# Patient Record
Sex: FEMALE | Race: WHITE | NOT HISPANIC OR LATINO | Employment: OTHER | URBAN - METROPOLITAN AREA
[De-identification: names, ages, dates, MRNs, and addresses within clinical notes are randomized per-mention and may not be internally consistent; named-entity substitution may affect disease eponyms.]

---

## 2021-01-18 ENCOUNTER — TRANSFERRED RECORDS (OUTPATIENT)
Dept: HEALTH INFORMATION MANAGEMENT | Facility: CLINIC | Age: 61
End: 2021-01-18

## 2021-01-22 ENCOUNTER — TRANSFERRED RECORDS (OUTPATIENT)
Dept: HEALTH INFORMATION MANAGEMENT | Facility: CLINIC | Age: 61
End: 2021-01-22

## 2021-02-04 ENCOUNTER — TRANSFERRED RECORDS (OUTPATIENT)
Dept: HEALTH INFORMATION MANAGEMENT | Facility: CLINIC | Age: 61
End: 2021-02-04

## 2021-02-18 ENCOUNTER — TRANSFERRED RECORDS (OUTPATIENT)
Dept: HEALTH INFORMATION MANAGEMENT | Facility: CLINIC | Age: 61
End: 2021-02-18

## 2021-02-23 ENCOUNTER — TRANSFERRED RECORDS (OUTPATIENT)
Dept: HEALTH INFORMATION MANAGEMENT | Facility: CLINIC | Age: 61
End: 2021-02-23

## 2021-03-17 ENCOUNTER — TRANSFERRED RECORDS (OUTPATIENT)
Dept: HEALTH INFORMATION MANAGEMENT | Facility: CLINIC | Age: 61
End: 2021-03-17

## 2021-03-18 ENCOUNTER — TRANSFERRED RECORDS (OUTPATIENT)
Dept: HEALTH INFORMATION MANAGEMENT | Facility: CLINIC | Age: 61
End: 2021-03-18

## 2021-03-22 ENCOUNTER — TRANSFERRED RECORDS (OUTPATIENT)
Dept: HEALTH INFORMATION MANAGEMENT | Facility: CLINIC | Age: 61
End: 2021-03-22

## 2021-03-23 ENCOUNTER — MEDICAL CORRESPONDENCE (OUTPATIENT)
Dept: HEALTH INFORMATION MANAGEMENT | Facility: CLINIC | Age: 61
End: 2021-03-23

## 2021-03-23 ENCOUNTER — TRANSCRIBE ORDERS (OUTPATIENT)
Dept: OTHER | Age: 61
End: 2021-03-23

## 2021-03-23 DIAGNOSIS — H49.00 THIRD NERVE PALSY: Primary | ICD-10-CM

## 2021-04-01 ENCOUNTER — OFFICE VISIT (OUTPATIENT)
Dept: OPHTHALMOLOGY | Facility: CLINIC | Age: 61
End: 2021-04-01
Attending: OPHTHALMOLOGY
Payer: COMMERCIAL

## 2021-04-01 DIAGNOSIS — H49.00 THIRD NERVE PALSY: ICD-10-CM

## 2021-04-01 DIAGNOSIS — H49.01 RIGHT OCULOMOTOR NERVE PALSY: ICD-10-CM

## 2021-04-01 DIAGNOSIS — H53.10 SUBJECTIVE VISUAL DISTURBANCE: Primary | ICD-10-CM

## 2021-04-01 PROBLEM — I25.10 CORONARY ARTERY CALCIFICATION SEEN ON CT SCAN: Status: ACTIVE | Noted: 2019-11-08

## 2021-04-01 PROBLEM — Z87.891 HISTORY OF SMOKING 30 OR MORE PACK YEARS: Status: ACTIVE | Noted: 2020-12-16

## 2021-04-01 PROBLEM — D32.9 MENINGIOMA (H): Status: ACTIVE | Noted: 2021-03-17

## 2021-04-01 PROBLEM — E55.9 VITAMIN D DEFICIENCY: Status: ACTIVE | Noted: 2020-12-17

## 2021-04-01 PROBLEM — J43.9 PULMONARY EMPHYSEMA (H): Status: ACTIVE | Noted: 2019-11-08

## 2021-04-01 PROBLEM — M85.80 OSTEOPENIA, UNSPECIFIED LOCATION: Status: ACTIVE | Noted: 2019-11-05

## 2021-04-01 PROBLEM — Z86.000 HISTORY OF DUCTAL CARCINOMA IN SITU (DCIS) OF BREAST: Status: ACTIVE | Noted: 2021-04-01

## 2021-04-01 PROBLEM — R73.9 STRESS HYPERGLYCEMIA: Status: ACTIVE | Noted: 2021-04-01

## 2021-04-01 PROBLEM — Z98.890 S/P CRANIOTOMY: Status: ACTIVE | Noted: 2021-04-01

## 2021-04-01 PROBLEM — R91.1 LUNG NODULE: Status: ACTIVE | Noted: 2021-01-04

## 2021-04-01 PROCEDURE — 92060 SENSORIMOTOR EXAMINATION: CPT | Performed by: OPHTHALMOLOGY

## 2021-04-01 PROCEDURE — G0463 HOSPITAL OUTPT CLINIC VISIT: HCPCS | Mod: 25

## 2021-04-01 PROCEDURE — 99204 OFFICE O/P NEW MOD 45 MIN: CPT | Performed by: OPHTHALMOLOGY

## 2021-04-01 RX ORDER — CALCIUM CARBONATE 500(1250)
250 TABLET,CHEWABLE ORAL DAILY
COMMUNITY

## 2021-04-01 RX ORDER — LISINOPRIL 10 MG/1
10 TABLET ORAL
Status: ON HOLD | COMMUNITY
Start: 2021-03-23 | End: 2022-02-21

## 2021-04-01 RX ORDER — ONDANSETRON 4 MG/1
4 TABLET, ORALLY DISINTEGRATING ORAL
Status: ON HOLD | COMMUNITY
Start: 2021-03-22 | End: 2022-02-21

## 2021-04-01 RX ORDER — AMOXICILLIN 250 MG
1-2 CAPSULE ORAL
Status: ON HOLD | COMMUNITY
Start: 2021-03-22 | End: 2022-02-21

## 2021-04-01 RX ORDER — DEXAMETHASONE 2 MG/1
TABLET ORAL
Status: ON HOLD | COMMUNITY
Start: 2021-03-22 | End: 2022-02-21

## 2021-04-01 RX ORDER — CEPHALEXIN 500 MG/1
CAPSULE ORAL
Status: ON HOLD | COMMUNITY
Start: 2021-03-02 | End: 2022-02-21

## 2021-04-01 RX ORDER — ATORVASTATIN CALCIUM 10 MG/1
TABLET, FILM COATED ORAL
COMMUNITY
Start: 2020-12-17

## 2021-04-01 ASSESSMENT — CONF VISUAL FIELD
OS_NORMAL: 1
OD_NORMAL: 1

## 2021-04-01 ASSESSMENT — TONOMETRY
OD_IOP_MMHG: 18
OS_IOP_MMHG: 22
IOP_METHOD: ICARE

## 2021-04-01 ASSESSMENT — CUP TO DISC RATIO
OD_RATIO: 0.3
OS_RATIO: 0.35

## 2021-04-01 ASSESSMENT — VISUAL ACUITY
OD_PH_SC: 20/50
OS_PH_SC: 20/25
OD_SC: 20/60
OS_SC: 20/40
METHOD: SNELLEN - LINEAR

## 2021-04-01 ASSESSMENT — EXTERNAL EXAM - LEFT EYE: OS_EXAM: NORMAL

## 2021-04-01 ASSESSMENT — SLIT LAMP EXAM - LIDS
COMMENTS: NORMAL
COMMENTS: NORMAL

## 2021-04-01 NOTE — PROGRESS NOTES
Assessment & Plan     Glory Ozuna is a 61 year old female with the following diagnoses:   1. Third nerve palsy    2. Subjective visual disturbance    3. Right oculomotor nerve palsy         Patient was sent for consultation by Dr. Kiko Zapata for right third nerve palsy onset s/p right pterional craniotomy for resection of suprasellar meningioma on 3/17/21.    HPI:  Since her meningioma resection on 3/17/21 she has been unable to open her right eye. She does note that her eye is displaced out when she opens here eye. She does note that when she opens the eye she is light sensitive. Visual acuity does not seem different in that eye and she is not experiencing any blurring or visual field defects. Since her surgery she often believes she sees her children and grandchildren at her home and they are not there. She also believed she had a roommate in the hospital during her stay when she did not. This occurs when she is resting with both eyes closed. She does not note any pain, double vision, bright flashes, floaters, or abnormal features of the right eye other than it being closed.         Review of outside testing:  3/17/21- MRI Brain      2/4/21- MRI Brain      My interpretation performed today of outside testing:  Large mass centered on right cavernous sinus       Review of outside clinical notes:  Neurosurgery notes     Past medical history:  Meningioma  Breast cancer 2008, s/p bilateral mastectomy and reconstruction 2011  Basal cell carcinoma    Family history / social history:  None    Previous smoker, quit 11/26/19, 1 pack/day x 30 years    Exam:  Vision is 20/60 in the right eye and 20/40 in the left eye. Intraocular pressure is 18/22. Color plates are 4/11 right, 11/11 left. Right pupil with an afferent pupillary defect. Confrontational visual fields are full. Sensorimotor exam is consistent with a combined complete cranial nerve III palsy and cranial nerve IV palsy on the right.  Slit lamp exam is unremarkable aside from 2= nuclear sclerotic cataract. Dilated fundus exam with positive spontaneous venous pulsations RIGHT eye with single druse in the macula of the RIGHT eye and normal left eye.     Tests ordered and interpreted today:  Sensorimotor exam    It is my impression that the patient has a complete CN III palsy and CN IV palsy on the right side that was discovered following excision of a suprasellar mass. Although visual acuity and color plates are reduced, there was no evidence of an afferent pupillary defect.  We did not do visual fields or optical coherence tomography today because of the significant exotropia.  Once the eyelid starts to open can consider patching versus black contact lenses versus prism.  Follow up in 3-4 months or sooner as needed for worsening symptoms.              Attending Physician Attestation:  Complete documentation of historical and exam elements from today's encounter can be found in the full encounter summary report (not reduplicated in this progress note).  I personally obtained the chief complaint(s) and history of present illness.  I confirmed and edited as necessary the review of systems, past medical/surgical history, family history, social history, and examination findings as documented by others; and I examined the patient myself.  I personally reviewed the relevant tests, images, and reports as documented above.  I formulated and edited as necessary the assessment and plan and discussed the findings and management plan with the patient and family. I was present with the medical student who participated in the service and in the documentation of this note. - MD Adi Plasencia, MS4

## 2021-04-01 NOTE — NURSING NOTE
Chief Complaints and History of Present Illnesses   Patient presents with     Diplopia Evaluation     Chief Complaint(s) and History of Present Illness(es)     Diplopia Evaluation               Comments     Glory Lurdes Ozuna is a 61 year old female who presents today for    Third nerve palsy. Onset after S/p Right pterional craniotomy for resection of meningioma 3/17/2021.     Wilda HALL 8:55 AM April 1, 2021

## 2021-04-01 NOTE — LETTER
2021         RE:  :  MRN: Glory Ozuna  1960  [id number]     Dear Dr. Perez,    Thank you for asking me to see your very pleasant patient, Glory Ozuna, in neuro-ophthalmic consultation.  I would like to thank you for sending your records and I have summarized them in the history of present illness.  My assessment and plan are below.  For further details, please see my attached clinic note.      Assessment & Plan     Glory Ozuna is a 61 year old female with the following diagnoses:   1. Third nerve palsy    2. Subjective visual disturbance    3. Right oculomotor nerve palsy         Patient was sent for consultation by Dr. Kiko Zapata for right third nerve palsy onset s/p right pterional craniotomy for resection of suprasellar meningioma on 3/17/21.    HPI:  Since her meningioma resection on 3/17/21 she has been unable to open her right eye. She does note that her eye is displaced out when she opens here eye. She does note that when she opens the eye she is light sensitive. Visual acuity does not seem different in that eye and she is not experiencing any blurring or visual field defects. Since her surgery she often believes she sees her children and grandchildren at her home and they are not there. She also believed she had a roommate in the hospital during her stay when she did not. This occurs when she is resting with both eyes closed. She does not note any pain, double vision, bright flashes, floaters, or abnormal features of the right eye other than it being closed.         Review of outside testing:  3/17/21- MRI Brain      21- MRI Brain      My interpretation performed today of outside testing:  Large mass centered on right cavernous sinus       Review of outside clinical notes:  Neurosurgery notes     Past medical history:  Meningioma  Breast cancer , s/p bilateral mastectomy and reconstruction   Basal cell carcinoma    Family history /  social history:  None    Previous smoker, quit 11/26/19, 1 pack/day x 30 years    Exam:  Vision is 20/60 in the right eye and 20/40 in the left eye. Intraocular pressure is 18/22. Color plates are 4/11 right, 11/11 left. Right pupil with an afferent pupillary defect. Confrontational visual fields are full. Sensorimotor exam is consistent with a combined complete cranial nerve III palsy and cranial nerve IV palsy on the right. Slit lamp exam is unremarkable aside from 2= nuclear sclerotic cataract. Dilated fundus exam with positive spontaneous venous pulsations RIGHT eye with single druse in the macula of the RIGHT eye and normal left eye.     Tests ordered and interpreted today:  Sensorimotor exam    It is my impression that the patient has a complete CN III palsy and CN IV palsy on the right side that was discovered following excision of a suprasellar mass. Although visual acuity and color plates are reduced, there was no evidence of an afferent pupillary defect.  We did not do visual fields or optical coherence tomography today because of the significant exotropia.  Once the eyelid starts to open can consider patching versus black contact lenses versus prism.  Follow up in 3-4 months or sooner as needed for worsening symptoms.             Again, thank you for allowing me to participate in the care of your patient.      Sincerely,    Mason Delgado MD  Professor  Ophthalmology Residency   Director of Neuro-Ophthalmology  [employer] Endowed Chair  Departments of Ophthalmology, Neurology, and Neurosurgery  [facility] [address] [place]  [address]  T - [phone]  F - [phone]  [email or web address]      CC: Kiko Perez MD  [facility]  [address] [address]  [facility] [address]  Via In Basket     Elvira Garg  [facility]  [address]  [place] [address]  Via Fax: [phone]    DX = 3rd  nerve palsy, 4th nerve palsy, cavernous sinus meningioma

## 2021-04-01 NOTE — Clinical Note
4/1/2021       RE: Glory Ozuna  [address]  [place] [address]     Dear Colleague,    Thank you for referring your patient, Glory Ozuna, to the [facility] at [facility]. Please see a copy of my visit note below.         Assessment & Plan     Glory Ozuna is a 61 year old female with the following diagnoses:   1. Third nerve palsy    2. Subjective visual disturbance    3. Right oculomotor nerve palsy         Patient was sent for consultation by Dr. Kiko Zapata for right third nerve palsy onset s/p right pterional craniotomy for resection of suprasellar meningioma on 3/17/21.    HPI:  Since her meningioma resection on 3/17/21 she has been unable to open her right eye. She does note that her eye is displaced out when she opens here eye. She does note that when she opens the eye she is light sensitive. Visual acuity does not seem different in that eye and she is not experiencing any blurring or visual field defects. Since her surgery she often believes she sees her children and grandchildren at her home and they are not there. She also believed she had a roommate in the hospital during her stay when she did not. This occurs when she is resting with both eyes closed. She does not note any pain, double vision, bright flashes, floaters, or abnormal features of the right eye other than it being closed.         Review of outside testing:  3/17/21- MRI Brain      2/4/21- MRI Brain      My interpretation performed today of outside testing:  Large mass centered on right cavernous sinus       Review of outside clinical notes:  Neurosurgery notes     Past medical history:  Meningioma  Breast cancer 2008, s/p bilateral mastectomy and reconstruction 2011  Basal cell carcinoma    Family history / social history:  None    Previous smoker, quit 11/26/19, 1 pack/day x 30 years    Exam:  Vision is 20/60 in the right eye and  20/40 in the left eye. Intraocular pressure is 18/22. Color plates are 4/11 right, 11/11 left. Right pupil with an afferent pupillary defect. Confrontational visual fields are full. Sensorimotor exam is consistent with a combined complete cranial nerve III palsy and cranial nerve IV palsy on the right. Slit lamp exam is unremarkable aside from 2= nuclear sclerotic cataract. Dilated fundus exam with positive spontaneous venous pulsations RIGHT eye with single druse in the macula of the RIGHT eye and normal left eye.     Tests ordered and interpreted today:  Sensorimotor exam    It is my impression that the patient has a complete CN III palsy and CN IV palsy on the right side that was discovered following excision of a suprasellar mass. Although visual acuity and color plates are reduced, there was no evidence of an afferent pupillary defect.  We did not do visual fields or optical coherence tomography today because of the significant exotropia.  Once the eyelid starts to open can consider patching versus black contact lenses versus prism.  Follow up in 3-4 months or sooner as needed for worsening symptoms.              Attending Physician Attestation:  Complete documentation of historical and exam elements from today's encounter can be found in the full encounter summary report (not reduplicated in this progress note).  I personally obtained the chief complaint(s) and history of present illness.  I confirmed and edited as necessary the review of systems, past medical/surgical history, family history, social history, and examination findings as documented by others; and I examined the patient myself.  I personally reviewed the relevant tests, images, and reports as documented above.  I formulated and edited as necessary the assessment and plan and discussed the findings and management plan with the patient and family. I was present with the medical student who participated in the service and in the documentation of  this note. - MD Adi Plasencia, MS4          Again, thank you for allowing me to participate in the care of your patient.      Sincerely,    Mason Delgado MD

## 2021-07-01 ENCOUNTER — OFFICE VISIT (OUTPATIENT)
Dept: OPHTHALMOLOGY | Facility: CLINIC | Age: 61
End: 2021-07-01
Attending: OPHTHALMOLOGY
Payer: COMMERCIAL

## 2021-07-01 DIAGNOSIS — H53.10 SUBJECTIVE VISUAL DISTURBANCE: Primary | ICD-10-CM

## 2021-07-01 DIAGNOSIS — H49.00 3RD CRANIAL NERVE PALSY, UNSPECIFIED LATERALITY: ICD-10-CM

## 2021-07-01 DIAGNOSIS — H53.40 VISUAL FIELD DEFECT: Primary | ICD-10-CM

## 2021-07-01 PROCEDURE — 99214 OFFICE O/P EST MOD 30 MIN: CPT | Mod: GC | Performed by: OPHTHALMOLOGY

## 2021-07-01 PROCEDURE — 92060 SENSORIMOTOR EXAMINATION: CPT | Performed by: OPHTHALMOLOGY

## 2021-07-01 PROCEDURE — 92083 EXTENDED VISUAL FIELD XM: CPT | Performed by: OPHTHALMOLOGY

## 2021-07-01 PROCEDURE — G0463 HOSPITAL OUTPT CLINIC VISIT: HCPCS | Mod: 25

## 2021-07-01 ASSESSMENT — EXTERNAL EXAM - LEFT EYE: OS_EXAM: NORMAL

## 2021-07-01 ASSESSMENT — EXTERNAL EXAM - RIGHT EYE: OD_EXAM: NORMAL

## 2021-07-01 ASSESSMENT — TONOMETRY
IOP_METHOD: ICARE
OS_IOP_MMHG: 12
OD_IOP_MMHG: 11

## 2021-07-01 ASSESSMENT — VISUAL ACUITY
OD_SC+: -2
OD_SC: 20/50
OS_SC: 20/30
METHOD: SNELLEN - LINEAR

## 2021-07-01 ASSESSMENT — CUP TO DISC RATIO
OD_RATIO: 0.3
OS_RATIO: 0.35

## 2021-07-01 NOTE — PROGRESS NOTES
Assessment & Plan     Glory Ozuna is a 61 year old female with the following diagnoses:   1. Visual field defect    2. 3rd cranial nerve palsy, unspecified laterality       Initial HPI (4/1/21):  Since her meningioma resection on 3/17/21 she has been unable to open her right eye. She does note that her eye is displaced out when she opens here eye. She does note that when she opens the eye she is light sensitive. Visual acuity does not seem different in that eye and she is not experiencing any blurring or visual field defects. Since her surgery she often believes she sees her children and grandchildren at her home and they are not there. She also believed she had a roommate in the hospital during her stay when she did not. This occurs when she is resting with both eyes closed. She does not note any pain, double vision, bright flashes, floaters, or abnormal features of the right eye other than it being closed.     Interval HPI (7/1/21): Patient was last seen on 4/1/21 for complete CN III palsy and CN IV palsy on the right side that was discovered following excision of a suprasellar mass.  We discussed that we could consider patching versus black contact lens versus prism once the eyelid started opening. Since her last appointment, the patient notes that her right upper eyelid has been progressively more open. The patient has persistent double vision, which she describes as two objects on top of one another when looking straight ahead and two objects at a diagonal from one another occasionally, unsure which gaze. She is unsure if there is any improvement in the double vision. She was seen by neurosurgery on 5/18/2021, recommended to follow up in 6 months.      Review of outside testing:  3/17/21- MRI Brain       2/4/21- MRI Brain       My interpretation performed today of outside testing:  Large mass centered on right cavernous sinus       Review of outside clinical notes:  Neurosurgery notes      Past  medical history:  Meningioma  Breast cancer 2008, s/p bilateral mastectomy and reconstruction 2011  Basal cell carcinoma     Family history / social history:  None     Previous smoker, quit 11/26/19, 1 pack/day x 30 years    HVF 24-2 (7/1/21):  Right eye: peripheral nasal constriction; high false neg errors  Left eye: peripheral superior constriction; high false neg errors     Exam:  Vision is 20/50 in the right eye and 20/30 in the left eye. Intraocular pressure is 11/12. Color plates are 9/11 right, 11/11 left. Right pupil without afferent pupillary defect. Confrontational visual fields are full. Sensorimotor exam is consistent with a combined complete cranial nerve III palsy and cranial nerve IV palsy on the right. Slit lamp exam is unremarkable aside from 2= nuclear sclerotic cataract. Dilated fundus exam at last appointment with positive spontaneous venous pulsations R IGHT eye with single druse in the macula of the RIGHT eye and normal left eye.      Tests ordered and interpreted today:  Sensorimotor exam  visual field      It is my impression that the patient has a complete CN III palsy and CN IV palsy on the right side that was discovered following excision of a suprasellar mass. Visual acuity and color plates are improving, there was no evidence of an afferent pupillary defect.  G top completed today.  As the patient's eyelid continues to lift independently, she has become more symptomatic with the double vision. Discussed with the patient that given different prism measurements in varying directions of gaze, prism correction unlikely to provide significant improvement to double vision at this time. Discussed patching vs fogging of glasses vs black contact lenses vs observation with the patient, who would like to continue to observe symptoms without monocular occlusion; she will consider contacting optometry to discuss contact lenses. Follow up in 4 months or sooner as needed for worsening symptoms.   Her  visual field shows bilateral visual field defect. It is unclear if this is related to an optic neuropathy or not.  Will check optical coherence tomography and visual field next visit.            Attending Physician Attestation:  Complete documentation of historical and exam elements from today's encounter can be found in the full encounter summary report (not reduplicated in this progress note).  I personally obtained the chief complaint(s) and history of present illness.  I confirmed and edited as necessary the review of systems, past medical/surgical history, family history, social history, and examination findings as documented by others; and I examined the patient myself.  I personally reviewed the relevant tests, images, and reports as documented above.  I formulated and edited as necessary the assessment and plan and discussed the findings and management plan with the patient and family. I personally reviewed the ophthalmic test(s) associated with this encounter, agree with the interpretation(s) as documented by the resident/fellow, and have edited the corresponding report(s) as necessary.  - Mason Soria MD  PGY-3, Department of Ophthalmology

## 2021-11-02 ENCOUNTER — OFFICE VISIT (OUTPATIENT)
Dept: OPHTHALMOLOGY | Facility: CLINIC | Age: 61
End: 2021-11-02
Attending: OPHTHALMOLOGY
Payer: COMMERCIAL

## 2021-11-02 DIAGNOSIS — H47.20 OPTIC ATROPHY: ICD-10-CM

## 2021-11-02 DIAGNOSIS — H53.10 SUBJECTIVE VISUAL DISTURBANCE: Primary | ICD-10-CM

## 2021-11-02 DIAGNOSIS — H49.00 3RD CRANIAL NERVE PALSY, UNSPECIFIED LATERALITY: Primary | ICD-10-CM

## 2021-11-02 DIAGNOSIS — H53.40 VISUAL FIELD DEFECT: ICD-10-CM

## 2021-11-02 DIAGNOSIS — H53.10 SUBJECTIVE VISUAL DISTURBANCE: ICD-10-CM

## 2021-11-02 PROCEDURE — 92060 SENSORIMOTOR EXAMINATION: CPT | Performed by: OPHTHALMOLOGY

## 2021-11-02 PROCEDURE — 92060 SENSORIMOTOR EXAMINATION: CPT | Mod: 26 | Performed by: OPHTHALMOLOGY

## 2021-11-02 PROCEDURE — 92014 COMPRE OPH EXAM EST PT 1/>: CPT | Performed by: OPHTHALMOLOGY

## 2021-11-02 PROCEDURE — 92083 EXTENDED VISUAL FIELD XM: CPT | Performed by: OPHTHALMOLOGY

## 2021-11-02 PROCEDURE — G0463 HOSPITAL OUTPT CLINIC VISIT: HCPCS

## 2021-11-02 PROCEDURE — 92133 CPTRZD OPH DX IMG PST SGM ON: CPT | Performed by: OPHTHALMOLOGY

## 2021-11-02 ASSESSMENT — EXTERNAL EXAM - LEFT EYE: OS_EXAM: NORMAL

## 2021-11-02 ASSESSMENT — CONF VISUAL FIELD
OD_NORMAL: 1
METHOD: COUNTING FINGERS
OS_NORMAL: 1

## 2021-11-02 ASSESSMENT — CUP TO DISC RATIO
OS_RATIO: 0.35
OD_RATIO: 0.3

## 2021-11-02 ASSESSMENT — VISUAL ACUITY
OD_SC: 20/25
OS_SC+: -1
OD_SC+: -3
OS_SC: 20/20
METHOD: SNELLEN - LINEAR

## 2021-11-02 ASSESSMENT — TONOMETRY
OD_IOP_MMHG: 10
OS_IOP_MMHG: 11
IOP_METHOD: ICARE

## 2021-11-02 ASSESSMENT — EXTERNAL EXAM - RIGHT EYE: OD_EXAM: NORMAL

## 2021-11-02 NOTE — PROGRESS NOTES
Assessment & Plan     Glory Ozuna is a 61 year old female with the following diagnoses:   1. 3rd cranial nerve palsy, unspecified laterality    2. Subjective visual disturbance    3. Visual field defect    4. Optic atrophy         Patient was last seen on 7/1/21 for follow up of complete third nerve third nerve palsy and fourth nerve palsy on the right side that was discovered following excision of a suprasellar mass.  Visual acuity and color plates were improving and there was no afferent pupillary defect.  Her visual field showed bilateral visual field defect and it was unclear if this was related to an optic neuropathy or not.  For the diplopia we felt prism would not be beneficial but could consider fogging, patching, or black contact lens.     Visual acuity 20/25 RIGHT eye and 20/20.  She has exotropia that is worse in left gaze and and left hypertropia with upgaze and right hypertropia with downgaze.  There is elevation, depression, and adduction deficits RIGHT eye.  Some aberrant regeneration.  Optical coherence tomography with retinal nerve fiber layer thinning both eyes. Visual field showed some superior constriction in both eyes which is stable from her previous visit.    It is my impression that patient has right third nerve palsy and fourth nerve palsy.  Her eye misalignment is improving today.  She does have repeatable visual field defect with retinal nerve fiber layer thinning on optical coherence tomography.    Follow up in 4 months to reassess her ocular motility and eye alignment or sooner as needed for worsening symptoms.          Attending Physician Attestation:  Complete documentation of historical and exam elements from today's encounter can be found in the full encounter summary report (not reduplicated in this progress note).  I personally obtained the chief complaint(s) and history of present illness.  I confirmed and edited as necessary the review of systems, past  medical/surgical history, family history, social history, and examination findings as documented by others; and I examined the patient myself.  I personally reviewed the relevant tests, images, and reports as documented above.  I formulated and edited as necessary the assessment and plan and discussed the findings and management plan with the patient and family. - Mason Delgado MD

## 2022-02-07 DIAGNOSIS — Z11.59 ENCOUNTER FOR SCREENING FOR OTHER VIRAL DISEASES: Primary | ICD-10-CM

## 2022-02-18 ENCOUNTER — HOSPITAL ENCOUNTER (OUTPATIENT)
Facility: CLINIC | Age: 62
Discharge: HOME | End: 2022-02-18
Attending: THORACIC SURGERY (CARDIOTHORACIC VASCULAR SURGERY) | Admitting: THORACIC SURGERY (CARDIOTHORACIC VASCULAR SURGERY)
Payer: COMMERCIAL

## 2022-02-18 ENCOUNTER — LAB (OUTPATIENT)
Dept: URGENT CARE | Facility: URGENT CARE | Age: 62
End: 2022-02-18
Attending: THORACIC SURGERY (CARDIOTHORACIC VASCULAR SURGERY)
Payer: COMMERCIAL

## 2022-02-18 PROCEDURE — U0005 INFEC AGEN DETEC AMPLI PROBE: HCPCS | Performed by: THORACIC SURGERY (CARDIOTHORACIC VASCULAR SURGERY)

## 2022-02-19 ENCOUNTER — ANESTHESIA EVENT (OUTPATIENT)
Dept: SURGERY | Facility: CLINIC | Age: 62
DRG: 165 | End: 2022-02-19
Payer: COMMERCIAL

## 2022-02-19 LAB — SARS-COV-2 RNA RESP QL NAA+PROBE: NEGATIVE

## 2022-02-21 ENCOUNTER — ANESTHESIA (OUTPATIENT)
Dept: SURGERY | Facility: CLINIC | Age: 62
DRG: 165 | End: 2022-02-21
Payer: COMMERCIAL

## 2022-02-21 ENCOUNTER — HOSPITAL ENCOUNTER (INPATIENT)
Facility: CLINIC | Age: 62
LOS: 1 days | Discharge: HOME | DRG: 165 | End: 2022-02-22
Attending: THORACIC SURGERY (CARDIOTHORACIC VASCULAR SURGERY) | Admitting: THORACIC SURGERY (CARDIOTHORACIC VASCULAR SURGERY)
Payer: COMMERCIAL

## 2022-02-21 ENCOUNTER — APPOINTMENT (OUTPATIENT)
Dept: GENERAL RADIOLOGY | Facility: CLINIC | Age: 62
DRG: 165 | End: 2022-02-21
Attending: THORACIC SURGERY (CARDIOTHORACIC VASCULAR SURGERY)
Payer: COMMERCIAL

## 2022-02-21 DIAGNOSIS — C34.12 PRIMARY ADENOCARCINOMA OF UPPER LOBE OF LEFT LUNG (H): Primary | ICD-10-CM

## 2022-02-21 LAB
ANION GAP SERPL CALCULATED.3IONS-SCNC: 5 MMOL/L (ref 3–14)
BUN SERPL-MCNC: 12 MG/DL (ref 7–30)
CALCIUM SERPL-MCNC: 9 MG/DL (ref 8.5–10.1)
CHLORIDE BLD-SCNC: 107 MMOL/L (ref 94–109)
CO2 SERPL-SCNC: 25 MMOL/L (ref 20–32)
CREAT SERPL-MCNC: 0.85 MG/DL (ref 0.52–1.04)
CREAT SERPL-MCNC: 0.9 MG/DL (ref 0.52–1.04)
ERYTHROCYTE [DISTWIDTH] IN BLOOD BY AUTOMATED COUNT: 13.8 % (ref 10–15)
GFR SERPL CREATININE-BSD FRML MDRD: 73 ML/MIN/1.73M2
GFR SERPL CREATININE-BSD FRML MDRD: 78 ML/MIN/1.73M2
GLUCOSE BLD-MCNC: 108 MG/DL (ref 70–99)
HCT VFR BLD AUTO: 41.5 % (ref 35–47)
HGB BLD-MCNC: 13.2 G/DL (ref 11.7–15.7)
INR PPP: 0.95 (ref 0.85–1.15)
MCH RBC QN AUTO: 31.3 PG (ref 26.5–33)
MCHC RBC AUTO-ENTMCNC: 31.8 G/DL (ref 31.5–36.5)
MCV RBC AUTO: 98 FL (ref 78–100)
PLATELET # BLD AUTO: 293 10E3/UL (ref 150–450)
POTASSIUM BLD-SCNC: 3.6 MMOL/L (ref 3.4–5.3)
RBC # BLD AUTO: 4.22 10E6/UL (ref 3.8–5.2)
SODIUM SERPL-SCNC: 137 MMOL/L (ref 133–144)
WBC # BLD AUTO: 7.4 10E3/UL (ref 4–11)

## 2022-02-21 PROCEDURE — 272N000001 HC OR GENERAL SUPPLY STERILE: Performed by: THORACIC SURGERY (CARDIOTHORACIC VASCULAR SURGERY)

## 2022-02-21 PROCEDURE — 88307 TISSUE EXAM BY PATHOLOGIST: CPT | Mod: 26 | Performed by: PATHOLOGY

## 2022-02-21 PROCEDURE — 88307 TISSUE EXAM BY PATHOLOGIST: CPT | Mod: TC | Performed by: THORACIC SURGERY (CARDIOTHORACIC VASCULAR SURGERY)

## 2022-02-21 PROCEDURE — 85610 PROTHROMBIN TIME: CPT | Performed by: THORACIC SURGERY (CARDIOTHORACIC VASCULAR SURGERY)

## 2022-02-21 PROCEDURE — 710N000009 HC RECOVERY PHASE 1, LEVEL 1, PER MIN: Performed by: THORACIC SURGERY (CARDIOTHORACIC VASCULAR SURGERY)

## 2022-02-21 PROCEDURE — 120N000001 HC R&B MED SURG/OB

## 2022-02-21 PROCEDURE — 258N000003 HC RX IP 258 OP 636

## 2022-02-21 PROCEDURE — 250N000011 HC RX IP 250 OP 636: Performed by: PHYSICIAN ASSISTANT

## 2022-02-21 PROCEDURE — 250N000009 HC RX 250

## 2022-02-21 PROCEDURE — 82565 ASSAY OF CREATININE: CPT | Performed by: PHYSICIAN ASSISTANT

## 2022-02-21 PROCEDURE — 250N000009 HC RX 250: Performed by: THORACIC SURGERY (CARDIOTHORACIC VASCULAR SURGERY)

## 2022-02-21 PROCEDURE — 36415 COLL VENOUS BLD VENIPUNCTURE: CPT | Performed by: PHYSICIAN ASSISTANT

## 2022-02-21 PROCEDURE — 0BBG4ZZ EXCISION OF LEFT UPPER LUNG LOBE, PERCUTANEOUS ENDOSCOPIC APPROACH: ICD-10-PCS | Performed by: THORACIC SURGERY (CARDIOTHORACIC VASCULAR SURGERY)

## 2022-02-21 PROCEDURE — 250N000011 HC RX IP 250 OP 636: Performed by: THORACIC SURGERY (CARDIOTHORACIC VASCULAR SURGERY)

## 2022-02-21 PROCEDURE — 36415 COLL VENOUS BLD VENIPUNCTURE: CPT | Performed by: THORACIC SURGERY (CARDIOTHORACIC VASCULAR SURGERY)

## 2022-02-21 PROCEDURE — 88313 SPECIAL STAINS GROUP 2: CPT | Mod: 26 | Performed by: PATHOLOGY

## 2022-02-21 PROCEDURE — 250N000013 HC RX MED GY IP 250 OP 250 PS 637: Performed by: PHYSICIAN ASSISTANT

## 2022-02-21 PROCEDURE — 82310 ASSAY OF CALCIUM: CPT | Performed by: THORACIC SURGERY (CARDIOTHORACIC VASCULAR SURGERY)

## 2022-02-21 PROCEDURE — 250N000025 HC SEVOFLURANE, PER MIN: Performed by: THORACIC SURGERY (CARDIOTHORACIC VASCULAR SURGERY)

## 2022-02-21 PROCEDURE — 999N000141 HC STATISTIC PRE-PROCEDURE NURSING ASSESSMENT: Performed by: THORACIC SURGERY (CARDIOTHORACIC VASCULAR SURGERY)

## 2022-02-21 PROCEDURE — 999N000063 XR CHEST PORT 1 VIEW

## 2022-02-21 PROCEDURE — 258N000003 HC RX IP 258 OP 636: Performed by: ANESTHESIOLOGY

## 2022-02-21 PROCEDURE — 250N000011 HC RX IP 250 OP 636

## 2022-02-21 PROCEDURE — 258N000003 HC RX IP 258 OP 636: Performed by: PHYSICIAN ASSISTANT

## 2022-02-21 PROCEDURE — 85027 COMPLETE CBC AUTOMATED: CPT | Performed by: THORACIC SURGERY (CARDIOTHORACIC VASCULAR SURGERY)

## 2022-02-21 PROCEDURE — 370N000017 HC ANESTHESIA TECHNICAL FEE, PER MIN: Performed by: THORACIC SURGERY (CARDIOTHORACIC VASCULAR SURGERY)

## 2022-02-21 PROCEDURE — 250N000011 HC RX IP 250 OP 636: Performed by: ANESTHESIOLOGY

## 2022-02-21 PROCEDURE — 250N000013 HC RX MED GY IP 250 OP 250 PS 637

## 2022-02-21 PROCEDURE — 360N000077 HC SURGERY LEVEL 4, PER MIN: Performed by: THORACIC SURGERY (CARDIOTHORACIC VASCULAR SURGERY)

## 2022-02-21 RX ORDER — ONDANSETRON 2 MG/ML
4 INJECTION INTRAMUSCULAR; INTRAVENOUS EVERY 30 MIN PRN
Status: DISCONTINUED | OUTPATIENT
Start: 2022-02-21 | End: 2022-02-21 | Stop reason: HOSPADM

## 2022-02-21 RX ORDER — ASPIRIN 81 MG/1
81 TABLET ORAL DAILY
Status: DISCONTINUED | OUTPATIENT
Start: 2022-02-22 | End: 2022-02-22 | Stop reason: HOSPADM

## 2022-02-21 RX ORDER — HYDROMORPHONE HYDROCHLORIDE 2 MG/1
2 TABLET ORAL EVERY 4 HOURS PRN
Status: DISCONTINUED | OUTPATIENT
Start: 2022-02-21 | End: 2022-02-22 | Stop reason: HOSPADM

## 2022-02-21 RX ORDER — ACETAMINOPHEN 325 MG/1
975 TABLET ORAL EVERY 8 HOURS
Status: DISCONTINUED | OUTPATIENT
Start: 2022-02-21 | End: 2022-02-22 | Stop reason: HOSPADM

## 2022-02-21 RX ORDER — IBUPROFEN 600 MG/1
600 TABLET, FILM COATED ORAL 4 TIMES DAILY
Status: DISCONTINUED | OUTPATIENT
Start: 2022-02-21 | End: 2022-02-22 | Stop reason: HOSPADM

## 2022-02-21 RX ORDER — NALOXONE HYDROCHLORIDE 0.4 MG/ML
0.2 INJECTION, SOLUTION INTRAMUSCULAR; INTRAVENOUS; SUBCUTANEOUS
Status: DISCONTINUED | OUTPATIENT
Start: 2022-02-21 | End: 2022-02-22 | Stop reason: HOSPADM

## 2022-02-21 RX ORDER — AMOXICILLIN 250 MG
1 CAPSULE ORAL 2 TIMES DAILY
Status: DISCONTINUED | OUTPATIENT
Start: 2022-02-21 | End: 2022-02-22 | Stop reason: HOSPADM

## 2022-02-21 RX ORDER — DEXAMETHASONE SODIUM PHOSPHATE 4 MG/ML
INJECTION, SOLUTION INTRA-ARTICULAR; INTRALESIONAL; INTRAMUSCULAR; INTRAVENOUS; SOFT TISSUE PRN
Status: DISCONTINUED | OUTPATIENT
Start: 2022-02-21 | End: 2022-02-21

## 2022-02-21 RX ORDER — NALOXONE HYDROCHLORIDE 0.4 MG/ML
0.4 INJECTION, SOLUTION INTRAMUSCULAR; INTRAVENOUS; SUBCUTANEOUS
Status: DISCONTINUED | OUTPATIENT
Start: 2022-02-21 | End: 2022-02-22 | Stop reason: HOSPADM

## 2022-02-21 RX ORDER — LABETALOL HYDROCHLORIDE 5 MG/ML
10 INJECTION, SOLUTION INTRAVENOUS
Status: DISCONTINUED | OUTPATIENT
Start: 2022-02-21 | End: 2022-02-21 | Stop reason: HOSPADM

## 2022-02-21 RX ORDER — HYDROMORPHONE HYDROCHLORIDE 2 MG/1
4 TABLET ORAL EVERY 4 HOURS PRN
Status: DISCONTINUED | OUTPATIENT
Start: 2022-02-21 | End: 2022-02-22 | Stop reason: HOSPADM

## 2022-02-21 RX ORDER — DEXMEDETOMIDINE HYDROCHLORIDE 4 UG/ML
INJECTION, SOLUTION INTRAVENOUS PRN
Status: DISCONTINUED | OUTPATIENT
Start: 2022-02-21 | End: 2022-02-21

## 2022-02-21 RX ORDER — SODIUM CHLORIDE, SODIUM LACTATE, POTASSIUM CHLORIDE, CALCIUM CHLORIDE 600; 310; 30; 20 MG/100ML; MG/100ML; MG/100ML; MG/100ML
INJECTION, SOLUTION INTRAVENOUS CONTINUOUS
Status: DISCONTINUED | OUTPATIENT
Start: 2022-02-21 | End: 2022-02-21 | Stop reason: HOSPADM

## 2022-02-21 RX ORDER — PROCHLORPERAZINE MALEATE 10 MG
10 TABLET ORAL EVERY 6 HOURS PRN
Status: DISCONTINUED | OUTPATIENT
Start: 2022-02-21 | End: 2022-02-22 | Stop reason: HOSPADM

## 2022-02-21 RX ORDER — AMOXICILLIN 250 MG
1-4 CAPSULE ORAL 2 TIMES DAILY PRN
Qty: 25 TABLET | Refills: 0 | Status: SHIPPED | OUTPATIENT
Start: 2022-02-21

## 2022-02-21 RX ORDER — ONDANSETRON 4 MG/1
4 TABLET, ORALLY DISINTEGRATING ORAL EVERY 30 MIN PRN
Status: DISCONTINUED | OUTPATIENT
Start: 2022-02-21 | End: 2022-02-21 | Stop reason: HOSPADM

## 2022-02-21 RX ORDER — HYDROMORPHONE HCL IN WATER/PF 6 MG/30 ML
0.2 PATIENT CONTROLLED ANALGESIA SYRINGE INTRAVENOUS
Status: DISCONTINUED | OUTPATIENT
Start: 2022-02-21 | End: 2022-02-22 | Stop reason: HOSPADM

## 2022-02-21 RX ORDER — IBUPROFEN 600 MG/1
600 TABLET, FILM COATED ORAL 4 TIMES DAILY
Qty: 150 TABLET | Refills: 0 | Status: SHIPPED | OUTPATIENT
Start: 2022-02-21

## 2022-02-21 RX ORDER — HYDROMORPHONE HYDROCHLORIDE 2 MG/1
2-4 TABLET ORAL EVERY 4 HOURS PRN
Qty: 25 TABLET | Refills: 0 | Status: SHIPPED | OUTPATIENT
Start: 2022-02-21

## 2022-02-21 RX ORDER — ACETAMINOPHEN 500 MG
1000 TABLET ORAL EVERY 6 HOURS
Qty: 150 TABLET | Refills: 0 | Status: SHIPPED | OUTPATIENT
Start: 2022-02-21

## 2022-02-21 RX ORDER — ACETAMINOPHEN 325 MG/1
650 TABLET ORAL EVERY 4 HOURS PRN
Status: DISCONTINUED | OUTPATIENT
Start: 2022-02-24 | End: 2022-02-22 | Stop reason: HOSPADM

## 2022-02-21 RX ORDER — FENTANYL CITRATE 50 UG/ML
25 INJECTION, SOLUTION INTRAMUSCULAR; INTRAVENOUS EVERY 5 MIN PRN
Status: DISCONTINUED | OUTPATIENT
Start: 2022-02-21 | End: 2022-02-21 | Stop reason: HOSPADM

## 2022-02-21 RX ORDER — NEOSTIGMINE METHYLSULFATE 1 MG/ML
VIAL (ML) INJECTION PRN
Status: DISCONTINUED | OUTPATIENT
Start: 2022-02-21 | End: 2022-02-21

## 2022-02-21 RX ORDER — BISACODYL 10 MG
10 SUPPOSITORY, RECTAL RECTAL DAILY PRN
Status: DISCONTINUED | OUTPATIENT
Start: 2022-02-21 | End: 2022-02-22 | Stop reason: HOSPADM

## 2022-02-21 RX ORDER — GLYCOPYRROLATE 0.2 MG/ML
INJECTION, SOLUTION INTRAMUSCULAR; INTRAVENOUS PRN
Status: DISCONTINUED | OUTPATIENT
Start: 2022-02-21 | End: 2022-02-21

## 2022-02-21 RX ORDER — PROPOFOL 10 MG/ML
INJECTION, EMULSION INTRAVENOUS PRN
Status: DISCONTINUED | OUTPATIENT
Start: 2022-02-21 | End: 2022-02-21

## 2022-02-21 RX ORDER — FAMOTIDINE 20 MG/1
20 TABLET, FILM COATED ORAL 2 TIMES DAILY
Status: DISCONTINUED | OUTPATIENT
Start: 2022-02-21 | End: 2022-02-22 | Stop reason: HOSPADM

## 2022-02-21 RX ORDER — BUPROPION HYDROCHLORIDE 150 MG/1
150 TABLET, EXTENDED RELEASE ORAL 2 TIMES DAILY
Status: DISCONTINUED | OUTPATIENT
Start: 2022-02-21 | End: 2022-02-22 | Stop reason: HOSPADM

## 2022-02-21 RX ORDER — CEFAZOLIN SODIUM/WATER 2 G/20 ML
2 SYRINGE (ML) INTRAVENOUS SEE ADMIN INSTRUCTIONS
Status: DISCONTINUED | OUTPATIENT
Start: 2022-02-21 | End: 2022-02-21 | Stop reason: HOSPADM

## 2022-02-21 RX ORDER — SODIUM CHLORIDE 9 MG/ML
INJECTION, SOLUTION INTRAVENOUS CONTINUOUS
Status: DISCONTINUED | OUTPATIENT
Start: 2022-02-21 | End: 2022-02-22 | Stop reason: HOSPADM

## 2022-02-21 RX ORDER — HYDROMORPHONE HCL IN WATER/PF 6 MG/30 ML
0.4 PATIENT CONTROLLED ANALGESIA SYRINGE INTRAVENOUS
Status: DISCONTINUED | OUTPATIENT
Start: 2022-02-21 | End: 2022-02-22 | Stop reason: HOSPADM

## 2022-02-21 RX ORDER — CALCIUM CARBONATE 500 MG/1
500 TABLET, CHEWABLE ORAL 4 TIMES DAILY PRN
Status: DISCONTINUED | OUTPATIENT
Start: 2022-02-21 | End: 2022-02-22 | Stop reason: HOSPADM

## 2022-02-21 RX ORDER — ONDANSETRON 4 MG/1
4 TABLET, ORALLY DISINTEGRATING ORAL EVERY 6 HOURS PRN
Status: DISCONTINUED | OUTPATIENT
Start: 2022-02-21 | End: 2022-02-22 | Stop reason: HOSPADM

## 2022-02-21 RX ORDER — GINSENG 100 MG
CAPSULE ORAL 3 TIMES DAILY
Status: DISCONTINUED | OUTPATIENT
Start: 2022-02-21 | End: 2022-02-22 | Stop reason: HOSPADM

## 2022-02-21 RX ORDER — MAGNESIUM HYDROXIDE 1200 MG/15ML
LIQUID ORAL PRN
Status: DISCONTINUED | OUTPATIENT
Start: 2022-02-21 | End: 2022-02-21 | Stop reason: HOSPADM

## 2022-02-21 RX ORDER — FENTANYL CITRATE 50 UG/ML
INJECTION, SOLUTION INTRAMUSCULAR; INTRAVENOUS PRN
Status: DISCONTINUED | OUTPATIENT
Start: 2022-02-21 | End: 2022-02-21

## 2022-02-21 RX ORDER — CEFAZOLIN SODIUM/WATER 2 G/20 ML
2 SYRINGE (ML) INTRAVENOUS
Status: COMPLETED | OUTPATIENT
Start: 2022-02-21 | End: 2022-02-21

## 2022-02-21 RX ORDER — ATORVASTATIN CALCIUM 10 MG/1
10 TABLET, FILM COATED ORAL EVERY EVENING
Status: DISCONTINUED | OUTPATIENT
Start: 2022-02-21 | End: 2022-02-22 | Stop reason: HOSPADM

## 2022-02-21 RX ORDER — BUPROPION HYDROCHLORIDE 150 MG/1
150 TABLET, FILM COATED, EXTENDED RELEASE ORAL 2 TIMES DAILY
COMMUNITY
Start: 2021-12-07

## 2022-02-21 RX ORDER — ONDANSETRON 2 MG/ML
4 INJECTION INTRAMUSCULAR; INTRAVENOUS EVERY 6 HOURS PRN
Status: DISCONTINUED | OUTPATIENT
Start: 2022-02-21 | End: 2022-02-22 | Stop reason: HOSPADM

## 2022-02-21 RX ORDER — LIDOCAINE HYDROCHLORIDE 20 MG/ML
INJECTION, SOLUTION INFILTRATION; PERINEURAL PRN
Status: DISCONTINUED | OUTPATIENT
Start: 2022-02-21 | End: 2022-02-21

## 2022-02-21 RX ORDER — LIDOCAINE 40 MG/G
CREAM TOPICAL
Status: DISCONTINUED | OUTPATIENT
Start: 2022-02-21 | End: 2022-02-22 | Stop reason: HOSPADM

## 2022-02-21 RX ORDER — POLYETHYLENE GLYCOL 3350 17 G/17G
17 POWDER, FOR SOLUTION ORAL DAILY
Status: DISCONTINUED | OUTPATIENT
Start: 2022-02-22 | End: 2022-02-22 | Stop reason: HOSPADM

## 2022-02-21 RX ORDER — ONDANSETRON 2 MG/ML
INJECTION INTRAMUSCULAR; INTRAVENOUS PRN
Status: DISCONTINUED | OUTPATIENT
Start: 2022-02-21 | End: 2022-02-21

## 2022-02-21 RX ORDER — BUPROPION HYDROCHLORIDE 150 MG/1
150 TABLET, FILM COATED, EXTENDED RELEASE ORAL 2 TIMES DAILY
Status: DISCONTINUED | OUTPATIENT
Start: 2022-02-21 | End: 2022-02-21 | Stop reason: CLARIF

## 2022-02-21 RX ADMIN — IBUPROFEN 600 MG: 600 TABLET ORAL at 12:15

## 2022-02-21 RX ADMIN — BUPROPION HYDROCHLORIDE 150 MG: 150 TABLET, EXTENDED RELEASE ORAL at 21:18

## 2022-02-21 RX ADMIN — DEXMEDETOMIDINE HYDROCHLORIDE 12 MCG: 200 INJECTION INTRAVENOUS at 08:02

## 2022-02-21 RX ADMIN — NEOSTIGMINE METHYLSULFATE 4 MG: 1 INJECTION, SOLUTION INTRAVENOUS at 08:16

## 2022-02-21 RX ADMIN — HYDROMORPHONE HYDROCHLORIDE 0.5 MG: 1 INJECTION, SOLUTION INTRAMUSCULAR; INTRAVENOUS; SUBCUTANEOUS at 07:47

## 2022-02-21 RX ADMIN — HYDROMORPHONE HYDROCHLORIDE 0.4 MG: 0.2 INJECTION, SOLUTION INTRAMUSCULAR; INTRAVENOUS; SUBCUTANEOUS at 12:25

## 2022-02-21 RX ADMIN — MIDAZOLAM 2 MG: 1 INJECTION INTRAMUSCULAR; INTRAVENOUS at 07:21

## 2022-02-21 RX ADMIN — PROPOFOL 120 MG: 10 INJECTION, EMULSION INTRAVENOUS at 07:26

## 2022-02-21 RX ADMIN — SENNOSIDES AND DOCUSATE SODIUM 1 TABLET: 50; 8.6 TABLET ORAL at 21:18

## 2022-02-21 RX ADMIN — LIDOCAINE HYDROCHLORIDE 80 MG: 20 INJECTION, SOLUTION INFILTRATION; PERINEURAL at 07:26

## 2022-02-21 RX ADMIN — SODIUM CHLORIDE: 9 INJECTION, SOLUTION INTRAVENOUS at 22:45

## 2022-02-21 RX ADMIN — ACETAMINOPHEN 975 MG: 325 TABLET, FILM COATED ORAL at 12:15

## 2022-02-21 RX ADMIN — ACETAMINOPHEN 975 MG: 325 TABLET, FILM COATED ORAL at 19:33

## 2022-02-21 RX ADMIN — ONDANSETRON 4 MG: 2 INJECTION INTRAMUSCULAR; INTRAVENOUS at 12:23

## 2022-02-21 RX ADMIN — Medication 2 G: at 07:21

## 2022-02-21 RX ADMIN — SODIUM CHLORIDE, POTASSIUM CHLORIDE, SODIUM LACTATE AND CALCIUM CHLORIDE: 600; 310; 30; 20 INJECTION, SOLUTION INTRAVENOUS at 06:31

## 2022-02-21 RX ADMIN — IBUPROFEN 600 MG: 600 TABLET ORAL at 18:19

## 2022-02-21 RX ADMIN — FAMOTIDINE 20 MG: 20 TABLET ORAL at 21:18

## 2022-02-21 RX ADMIN — ROCURONIUM BROMIDE 40 MG: 50 INJECTION, SOLUTION INTRAVENOUS at 07:26

## 2022-02-21 RX ADMIN — FENTANYL CITRATE 50 MCG: 50 INJECTION, SOLUTION INTRAMUSCULAR; INTRAVENOUS at 07:26

## 2022-02-21 RX ADMIN — SENNOSIDES AND DOCUSATE SODIUM 1 TABLET: 50; 8.6 TABLET ORAL at 12:15

## 2022-02-21 RX ADMIN — BUPROPION HYDROCHLORIDE 150 MG: 150 TABLET, EXTENDED RELEASE ORAL at 12:15

## 2022-02-21 RX ADMIN — FAMOTIDINE 20 MG: 20 TABLET ORAL at 12:15

## 2022-02-21 RX ADMIN — IBUPROFEN 600 MG: 600 TABLET ORAL at 22:57

## 2022-02-21 RX ADMIN — FENTANYL CITRATE 50 MCG: 50 INJECTION, SOLUTION INTRAMUSCULAR; INTRAVENOUS at 07:44

## 2022-02-21 RX ADMIN — ATORVASTATIN CALCIUM 10 MG: 10 TABLET, FILM COATED ORAL at 19:33

## 2022-02-21 RX ADMIN — FENTANYL CITRATE 25 MCG: 0.05 INJECTION, SOLUTION INTRAMUSCULAR; INTRAVENOUS at 09:16

## 2022-02-21 RX ADMIN — SODIUM CHLORIDE: 9 INJECTION, SOLUTION INTRAVENOUS at 12:15

## 2022-02-21 RX ADMIN — PHENYLEPHRINE HYDROCHLORIDE 100 MCG: 10 INJECTION INTRAVENOUS at 08:16

## 2022-02-21 RX ADMIN — ONDANSETRON 4 MG: 2 INJECTION INTRAMUSCULAR; INTRAVENOUS at 08:14

## 2022-02-21 RX ADMIN — DEXAMETHASONE SODIUM PHOSPHATE 4 MG: 4 INJECTION, SOLUTION INTRA-ARTICULAR; INTRALESIONAL; INTRAMUSCULAR; INTRAVENOUS; SOFT TISSUE at 07:29

## 2022-02-21 RX ADMIN — FENTANYL CITRATE 25 MCG: 0.05 INJECTION, SOLUTION INTRAMUSCULAR; INTRAVENOUS at 09:24

## 2022-02-21 RX ADMIN — DEXMEDETOMIDINE HYDROCHLORIDE 8 MCG: 200 INJECTION INTRAVENOUS at 07:50

## 2022-02-21 RX ADMIN — GLYCOPYRROLATE 0.4 MG: 0.2 INJECTION, SOLUTION INTRAMUSCULAR; INTRAVENOUS at 08:16

## 2022-02-21 ASSESSMENT — ACTIVITIES OF DAILY LIVING (ADL)
ADLS_ACUITY_SCORE: 3
ADLS_ACUITY_SCORE: 12
ADLS_ACUITY_SCORE: 3

## 2022-02-21 ASSESSMENT — LIFESTYLE VARIABLES: TOBACCO_USE: 1

## 2022-02-21 ASSESSMENT — COPD QUESTIONNAIRES: COPD: 1

## 2022-02-21 NOTE — ANESTHESIA PREPROCEDURE EVALUATION
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 No pertinent surgical history.   Allergies   Allergen Reactions     Codeine Unknown     Other reaction(s): *Unknown - Pt Doesn't Remember     Sulfa Drugs Unknown     unknown        Social History     Tobacco Use     Smoking status: Current Every Day Smoker     Packs/day: 0.50     Years: 15.00     Pack years: 7.50     Types: Cigarettes     Smokeless tobacco: Never Used   Substance Use Topics     Alcohol use: Yes     Comment: occasionally      Wt Readings from Last 1 Encounters:   22 68 kg (150 lb)        Anesthesia Evaluation            ROS/MED HX  ENT/Pulmonary: Comment: Hx pneumothorax after Bx;    (+) tobacco use, Current use, 1 packs/day, COPD,  (-) sleep apnea   Neurologic: Comment: Hx meningioma; double vision following surgery;      Cardiovascular:     (+) -Peripheral Vascular Disease-- Carotid Stenosis. ---    METS/Exercise Tolerance:     Hematologic:       Musculoskeletal:       GI/Hepatic:     (+) GERD,     Renal/Genitourinary:       Endo:       Psychiatric/Substance Use:       Infectious Disease:       Malignancy:   (+) Malignancy, History of Breast.    Other:            Physical Exam    Airway        Mallampati: I   TM distance: > 3 FB   Neck ROM: full   Mouth opening: > 3 cm    Respiratory Devices and Support         Dental  no notable dental history         Cardiovascular   cardiovascular exam normal          Pulmonary   pulmonary exam normal                OUTSIDE LABS:  CBC:   Lab Results   Component Value Date    WBC 7.4  02/21/2022    HGB 13.2 02/21/2022    HCT 41.5 02/21/2022     02/21/2022     BMP: No results found for: NA, POTASSIUM, CHLORIDE, CO2, BUN, CR, GLC  COAGS: No results found for: PTT, INR, FIBR  POC: No results found for: BGM, HCG, HCGS  HEPATIC: No results found for: ALBUMIN, PROTTOTAL, ALT, AST, GGT, ALKPHOS, BILITOTAL, BILIDIRECT, JORGE LUIS  OTHER: No results found for: PH, LACT, A1C, YVONNE, PHOS, MAG, LIPASE, AMYLASE, TSH, T4, T3, CRP, SED    Anesthesia Plan    ASA Status:  3   NPO Status:  NPO Appropriate    Anesthesia Type: General.     - Airway: ETT   Induction: Intravenous.   Maintenance: Balanced.   Techniques and Equipment:     - Airway: Double lumen ETT         Consents    Anesthesia Plan(s) and associated risks, benefits, and realistic alternatives discussed. Questions answered and patient/representative(s) expressed understanding.    - Discussed:     - Discussed with:  Patient         Postoperative Care    Pain management: IV analgesics.   PONV prophylaxis: Ondansetron (or other 5HT-3), Dexamethasone or Solumedrol     Comments:                ANTONI STEWART MD

## 2022-02-21 NOTE — PROGRESS NOTES
PTA medications updated by Medication Scribe prior to surgery via phone call with patient (last doses completed by Nurse)     Medication history sources: Patient, Surescripts and H&P  In the past week, patient estimated taking medication this percent of the time: Greater than 90%  Adherence assessment: N/A Not Observed    Significant changes made to the medication list:  Patient reports no longer taking the following meds (med scribe removed from PTA med list): Cephalexin, Dexamethasone, Lisinopril, Omeprazole, Ondansetron, Senna-Docusate      Additional medication history information:   None    Medication reconciliation completed by provider prior to medication history? No    Time spent in this activity: 20 minutes    The information provided in this note is only as accurate as the sources available at the time of update(s)    Prior to Admission medications    Medication Sig Last Dose Taking? Auth Provider   aspirin (ASA) 81 MG EC tablet Take 81 mg by mouth 2/15/2022 at am Yes Reported, Patient   atorvastatin (LIPITOR) 10 MG tablet TAKE 1 TABLET BY MOUTH ONCE DAILY 2/20/2022 at pm Yes Reported, Patient   buPROPion (ZYBAN) 150 MG 12 hr tablet Take 150 mg by mouth 2 times daily  2/20/2022 at pm Yes Reported, Patient   calcium carbonate 1250 (500 Ca) MG CHEW Take 250 mg by mouth daily  2/20/2022 at am Yes Reported, Patient   calcium carbonate-vitamin D (OSCAL W/D) 500-200 MG-UNIT tablet Take 1 tablet by mouth daily 2/20/2022 at am Yes Reported, Patient   cholecalciferol 25 MCG (1000 UT) TABS Take 1,000 Units by mouth daily  2/20/2022 at am Yes Reported, Patient     Medication history completed by:    Francisco Huynh CPhT  Medication Scribe  [facility]

## 2022-02-21 NOTE — ANESTHESIA CARE TRANSFER NOTE
Patient: Glory Ozuna    Procedure: Procedure(s):  LEFT VIDEO ASSISED THORACOSCOPY WEDGE RESECTION LEFT UPPER LOBE LUNG NODULE       Diagnosis: Non-small cell cancer of upper lobe of lung (H) [C34.10]  Diagnosis Additional Information: No value filed.    Anesthesia Type:   General     Note:    Oropharynx: oropharynx clear of all foreign objects  Level of Consciousness: drowsy  Oxygen Supplementation: face mask  Level of Supplemental Oxygen (L/min / FiO2): 6  Independent Airway: airway patency satisfactory and stable  Dentition: dentition unchanged  Vital Signs Stable: post-procedure vital signs reviewed and stable  Report to RN Given: handoff report given  Patient transferred to: PACU    Handoff Report: Identifed the Patient, Identified the Reponsible Provider, Reviewed the pertinent medical history, Discussed the surgical course, Reviewed Intra-OP anesthesia mangement and issues during anesthesia, Set expectations for post-procedure period and Allowed opportunity for questions and acknowledgement of understanding      Vitals:  Vitals Value Taken Time   /82 02/21/22 0849   Temp     Pulse 72 02/21/22 0850   Resp 29 02/21/22 0850   SpO2 100 % 02/21/22 0850   Vitals shown include unvalidated device data.    Electronically Signed By: KIMMIE Cueva CRNA  February 21, 2022  8:50 AM

## 2022-02-21 NOTE — PROGRESS NOTES
POD 0 from thoracoscopy wedge resection of L upper lobe lung nodule. Dx. Of non-small cell cancer of lung - adenocarcinoma. HX. Of COPD, emphysema, and strabismus. A&O x.4 VSS on 1.5LPM via NC. Capno reading 34/8. Tolerating clear liquids and oral medications. Not oob yet. Severe pain and nausea managed with IV Dilaudid and Zofran x1 this shift. Chest tube to L chest tidaling noted, to suction; dressing CDI. 111 ml of bloody output at this time. No air leak noted.  at bedside.

## 2022-02-21 NOTE — ANESTHESIA PROCEDURE NOTES
Airway       Patient location during procedure: OR       Procedure Start/Stop Times: 2/21/2022 7:29 AM  Staff -        Anesthesiologist:  Christian Murdock MD       CRNA: Rupal Dior APRN CRNA       Performed By: CRNA  Consent for Airway        Urgency: elective  Indications and Patient Condition       Indications for airway management: abelardo-procedural       Induction type:intravenous       Mask difficulty assessment: 2 - vent by mask + OA or adjuvant +/- NMBA    Final Airway Details       Final airway type: endotracheal airway       Successful airway: ETT - double lumen left  Endotracheal Airway Details        Cuffed: yes       Successful intubation technique: direct laryngoscopy and flexible bronchoscopy       DL Blade Type: MAC 3       Grade View of Cords: 1       Adjucts: stylet       Position: Right       Measured from: lips       Bite block used: None       ETT Double lumen (fr): 37    Post intubation assessment        Placement verified by: capnometry, equal breath sounds and chest rise        Number of attempts at approach: 1       Secured with: pink tape       Ease of procedure: easy       Dentition: Intact and Unchanged

## 2022-02-21 NOTE — OP NOTE
Procedure Date: 02/21/2022    SURGEON:  Maycol Mirza MD    FIRST ASSISTANT:  Shadia Fuentes PA-C    PREOPERATIVE DIAGNOSIS:  Adenocarcinoma, left upper lobe lung.    POSTOPERATIVE DIAGNOSIS:  Adenocarcinoma, left upper lobe lung.    PROCEDURE:  Left video-assisted thoracoscopy and wedge resection of left upper lobe lung nodule.    ANESTHESIA:  General with a double-lumen endotracheal tube.    INDICATIONS FOR PROCEDURE:  A 61-year-old woman with a smoking history.  She underwent low-dose CT screening.  Most recent CT screening shows an enlarging spiculated nodule of the periphery of the left upper lobe lung near the fissure.  This nodule was hypermetabolic on PET/CT scan.  There are a few other tiny ground-glass opacities, which are too small to characterize and will require ongoing followup.  The CT-guided biopsy of the nodule was positive for adenocarcinoma.  Options of treatment were discussed.  She elected to proceed with video-assisted thoracoscopy and wedge resection.  Pulmonary function tests are adequate.  She has some emphysema.  Because of the other ground-glass opacities, it was elected to proceed with a more limited resection.    DESCRIPTION OF PROCEDURE:  The patient was brought and placed in supine position under general anesthesia with double endotracheal tube.  The patient was placed in the right lateral decubitus position.  The left chest was prepared and draped in sterile fashion using ChloraPrep.  Ventilation of the left lung was discontinued.  Three thoracoscopic incisions were made in usual fashion.  There were significant changes of emphysema.  There was a band of adhesion in the fissure, which was divided with application of an [place] 60 gold stapling device.  Then the nodule was clearly identified.  Using multiple applications of an [place] 60 gold stapling device, wedge resection was done with excellent margin.  The staple line was hemostatic.  The specimen was placed in an  EndoCatch bag and retrieved through the anterior incision and submitted for permanent section.  Hemostasis was verified and was excellent.  Through a separate stab wound, a 28 straight chest tube was placed and sutured to skin with 2-0 silk suture.  Then the 3 thoracoscopic incisions were closed in usual fashion.  30 mL of Marcaine 0.5% without epinephrine was injected as costal blocks.    ESTIMATED BLOOD LOSS:  Minimal.    COUNTS:  Sponge count correct.    Shadia Fuentes PA-C, was the first assistant during the procedure.  Her role as first assistant was essential and necessary in accomplishing the steps of the procedure as described above, providing exposure, retraction, and handling of the scope.    Maycol Mirza MD        D: 2022   T: 2022   MT: [id number]    Name:     CHAPARRITA JORGE  MRN:      [id number]        Account:        [id number]   :      1960           Procedure Date: 2022     Document: [id number]

## 2022-02-21 NOTE — ANESTHESIA POSTPROCEDURE EVALUATION
Patient: Glory Ozuna    Procedure: Procedure(s):  LEFT VIDEO ASSISED THORACOSCOPY WEDGE RESECTION LEFT UPPER LOBE LUNG NODULE       Diagnosis:Non-small cell cancer of upper lobe of lung (H) [C34.10]  Diagnosis Additional Information: No value filed.    Anesthesia Type:  General    Note:  Disposition: Admission   Postop Pain Control: Uneventful            Sign Out: Well controlled pain   PONV: No   Neuro/Psych: Uneventful            Sign Out: Acceptable/Baseline neuro status   Airway/Respiratory: Uneventful            Sign Out: Acceptable/Baseline resp. status   CV/Hemodynamics: Uneventful            Sign Out: Acceptable CV status; No obvious hypovolemia; No obvious fluid overload   Other NRE: NONE   DID A NON-ROUTINE EVENT OCCUR? No           Last vitals:  Vitals Value Taken Time   BP 92/70 02/21/22 1030   Temp 36.1  C (97  F) 02/21/22 0849   Pulse 63 02/21/22 1031   Resp 19 02/21/22 1031   SpO2 94 % 02/21/22 1030   Vitals shown include unvalidated device data.    Electronically Signed By: ANTONI STEWART MD  February 21, 2022  11:08 AM

## 2022-02-21 NOTE — DISCHARGE INSTRUCTIONS
"[facility]  Discharge Orders & Follow-up Care  Video-Assisted Thoracoscopy or Thoracotomy    Follow up with in 7-10 days at the [facility] office.  You will have a Chest x-ray 30 minutes prior at [facility] on the 1st floor of the Building, then your appointment will be immediately to follow. Please call Kianna at [phone] to schedule this appointment.  The [facility] office is located at [address] [place] [address].     Your appointment will be with either Priscilla Trimble PA-C or Dr. Mirza.        ANABEL. Patient Care:  Call Dr Mirza  office @ [phone] if you experience:  *Severe chills or a fever or 101 F or higher on two occasions  *Increased incisional pain that cannot be relieved with rest or pain medications  *Presence of unusual incisional or chest tube site drainage that is odorous, green or yellow in color, or if your incision is warm, red or swollen  *Coughing up bright red blood or greenish-yellow secretions  *Chest pain that gets worse with deep breathing or a significant increase in shortness of breath  *Inability to urinate or have a bowel movement  *New pain or swelling in your legs    In an emergency, call [phone] or have someone drive you to the nearest Emergency Department    Pain Relief:  You may have been given a prescription for narcotic pain medicine.  You may also take ibuprofen and acetaminophen either as a new prescription  or over the counter.  Recommended dosages are:  600 mg Ibuprofen every 6 hours as needed and 650 mg Acetaminophen every 4 hours as needed.  Many patients get good pain relief by \"staggering\" these medications.     Constipation:  Narcotic pain medication, general anesthesia, and time in the hospital with less activity than normal can all cause constipation. Please take a stool softener (what you have at home or one that was prescribed during hospital discharge, such as Senokot-S, docusate sodium, Miralax, Milk of " Magnesia) while you are taking narcotics to prevent constipation. Stop taking the stool softener once you are done taking narcotics or if you begin having loose stools/diarrhea. Please call our clinic nurse, Neha, at [phone] if you are not having success (not having BMs) with your current stool softener.     No driving while on narcotics.     Activity:  No activity restrictions for a scope procedure/thoracoscopy      Wound Care:  *You should look at your incision each day and keep it clean while it heals  *Do not apply any creams, salves such as Bacitracin, or ointments on the incision while it is healing  * Steri strips (thin white paper strips) will be present on the incision(s) and they will peel off as your incision heals-- otherwise, they will be removed at your post-op appointment.    *Remove the dressings covering your chest tube site 48 hours after your discharge from the hospital. You may then shower.  Wash the incision and chest tube site(s) daily with soap and water. No bathing or immersing incision underwater for approximately 2 weeks or until the chest tube sites are completely healed.     Place a dry gauze dressing (and tape) over the chest tube site because it is normal to have some drainage for a few days after the chest tube is removed.  Do not be alarmed if a large amount of fluid drains (should be pink or yellow) either spontaneously or with coughing or exertion. If this happens, just place a larger dry gauze dressing over the chest tube site-- it will stop and scab over in about a week or so. Once the drainage stops, you can stop covering the chest tube site with a dressing. Call our office if the drainage is milky or green in color or foul-smelling.    B. Respiratory:  Utilize Incentive spirometer and flutter valve/acapella (if you received one) 10 times in a row every few hours while awake for a few weeks after discharging home from the hospital    C. Activity:  It may take a few months  to regain your normal energy level/stamina. It is important during your recovery to get regular physical activity:  *walk each day at a comfortable pace  *climb stairs as tolerated  *take some rest periods each day but try not to take too many long naps, as this can affect your sleep at night    D. Returning to Work:  Time away from work will depend on your situation. In general, you will need between 1-6 weeks to recover from surgery. Specific dates for returning to work can be discussed at your post-op appointments.

## 2022-02-22 ENCOUNTER — APPOINTMENT (OUTPATIENT)
Dept: GENERAL RADIOLOGY | Facility: CLINIC | Age: 62
DRG: 165 | End: 2022-02-22
Attending: PHYSICIAN ASSISTANT
Payer: COMMERCIAL

## 2022-02-22 VITALS
OXYGEN SATURATION: 92 % | BODY MASS INDEX: 24.99 KG/M2 | HEART RATE: 65 BPM | TEMPERATURE: 98.6 F | RESPIRATION RATE: 18 BRPM | SYSTOLIC BLOOD PRESSURE: 111 MMHG | WEIGHT: 150 LBS | HEIGHT: 65 IN | DIASTOLIC BLOOD PRESSURE: 65 MMHG

## 2022-02-22 LAB — GLUCOSE BLDC GLUCOMTR-MCNC: 89 MG/DL (ref 70–99)

## 2022-02-22 PROCEDURE — 250N000011 HC RX IP 250 OP 636: Performed by: PHYSICIAN ASSISTANT

## 2022-02-22 PROCEDURE — 250N000013 HC RX MED GY IP 250 OP 250 PS 637: Performed by: PHYSICIAN ASSISTANT

## 2022-02-22 PROCEDURE — 71045 X-RAY EXAM CHEST 1 VIEW: CPT

## 2022-02-22 PROCEDURE — 250N000009 HC RX 250: Performed by: PHYSICIAN ASSISTANT

## 2022-02-22 PROCEDURE — 250N000013 HC RX MED GY IP 250 OP 250 PS 637

## 2022-02-22 RX ADMIN — BUPROPION HYDROCHLORIDE 150 MG: 150 TABLET, EXTENDED RELEASE ORAL at 09:51

## 2022-02-22 RX ADMIN — ENOXAPARIN SODIUM 40 MG: 40 INJECTION SUBCUTANEOUS at 05:51

## 2022-02-22 RX ADMIN — BACITRACIN: 500 OINTMENT TOPICAL at 09:52

## 2022-02-22 RX ADMIN — ACETAMINOPHEN 975 MG: 325 TABLET, FILM COATED ORAL at 03:37

## 2022-02-22 RX ADMIN — ASPIRIN 81 MG: 81 TABLET, COATED ORAL at 09:51

## 2022-02-22 RX ADMIN — FAMOTIDINE 20 MG: 20 TABLET ORAL at 09:51

## 2022-02-22 RX ADMIN — IBUPROFEN 600 MG: 600 TABLET ORAL at 07:30

## 2022-02-22 ASSESSMENT — ACTIVITIES OF DAILY LIVING (ADL)
ADLS_ACUITY_SCORE: 3

## 2022-02-22 NOTE — PROGRESS NOTES
THORACIC SURGERY POD # 1    Doing well  AVSS on RA  No air leak, no bleeding  Wounds OK  CXR no ptx with CT clamped overnight    CT out    D/C today  Discussed findings and procedure  Final path pending    ELMER RUSSELL MD [facility] ONCOLOGY THORACIC SURGERY  CELL:  [phone]  OFFICE: [phone]

## 2022-02-22 NOTE — PLAN OF CARE
Goal Outcome Evaluation: A&Ox4. VSS on RA. Denies SOB, Chest pain. Chest tube clamped at midnight and removed from suction. Port sites WNL. Tube site dressing CDI. Denies pain. Using IS. Voiding adequately. Chest X-Ray completed at bedside. Chest tube unclamped and returned to suction post x-ray. Output in tube 8ml serosanguinous drainage.  Discharge pending.    Plan of Care Reviewed With: patient

## 2022-02-22 NOTE — PLAN OF CARE
Goal Outcome Evaluation:    Plan of Care Reviewed With: patient     Orientation: A&Ox4  VS/ O2/ IV: VSS on RA, HEMANT NS @75mL/hr  GI: BS present  Mobility: assist x1 with GB  Pain mgmt: managed with scheduled tylenol and ibuprofen  Diet: tolerating regular diet  Bowel/ Bladder: voids in bathroom, no BM this shift  Drains/ Devices: chest tube  Skin: 3 port sites on abdomen  CMS: intact  Tests/ Procedures: POD 0  Consults/ Providers: thoracic surgery  Discharge/ Plan: discharge pending

## 2022-02-23 LAB
PATH REPORT.COMMENTS IMP SPEC: ABNORMAL
PATH REPORT.COMMENTS IMP SPEC: YES
PATH REPORT.FINAL DX SPEC: ABNORMAL
PATH REPORT.GROSS SPEC: ABNORMAL
PATH REPORT.MICROSCOPIC SPEC OTHER STN: ABNORMAL
PATH REPORT.RELEVANT HX SPEC: ABNORMAL
PATHOLOGY SYNOPTIC REPORT: ABNORMAL
PHOTO IMAGE: ABNORMAL

## 2022-02-28 NOTE — DISCHARGE SUMMARY
THORACIC SURGERY HOSPITAL DISCHARGE SUMMARY  [facility] - [facility] ONCOLOGY - THORACIC SURGERY  [address], [address]  [place] [address]  Phone [phone]  [email or web address]    2/28/2022     Elvira Garg   [facility] [address] / [place]*  Phone: [phone]   Fax: [phone]        Re: Glory Ozuna             1960             [id number]              Dates of Hospitalization: 2/21/2022 - 2/22/2022   Date of Service (when I saw the patient):2/22/22      Dear Dr. Garg:     As you are aware, we had the pleasure of caring for your patient,  Glory Ozuna here at [facility].  She is a 61-year-old woman with a smoking history.  She underwent low-dose CT screening.  Most recent CT screening shows an enlarging spiculated nodule of the periphery of the left upper lobe lung near the fissure.  This nodule was hypermetabolic on PET/CT scan.  There are a few other tiny ground-glass opacities, which are too small to characterize and will require ongoing followup.  The CT-guided biopsy of the nodule was positive for adenocarcinoma.  Options of treatment were discussed.  She elected to proceed with video-assisted thoracoscopy and wedge resection.  Pulmonary function tests are adequate.  She has some emphysema.  Because of the other ground-glass opacities, it was elected to proceed with a more limited resection.    On 2/21/2022, Dr. Maycol Mirza performed the following:    Procedure/Surgery Information   Procedure: Procedure(s):  LEFT VIDEO ASSISED THORACOSCOPY WEDGE RESECTION LEFT UPPER LOBE LUNG NODULE   Surgeon(s): Surgeon(s) and Role:     * Maycol Mirza MD - Primary     * Shadia Fuentes PA-C - Assisting   Specimens: ID Type Source Tests Collected by Time Destination   1 : LEFT UPPER LOBE LUNG NODULE Tissue Lung, Upper Lobe, Left SURGICAL PATHOLOGY EXAM Maycol Mirza MD  2/21/2022  8:02 AM             Final Surgical Pathology Revealed:  0.6 cm moderately differentiated acinar predominant invasive adenocarcinoma of the left upper lobe lung, with benign surgical margins and no visceral pleural invasion. No mediastinal lymph nodes resected. Final pathologic stage [id number], Final Stage IA1.    Her post-operative course was unremarkable.      Consultations This Hospital Stay   None    Glory Ozuna has otherwise recovered sufficiently to be discharged to home today, 2/22/2022, on post-operative day number one for further convalescence.  Her incisions are healing well with no signs or symptoms of infection.  Her bowels have moved sufficiently and she is tolerating diet and activity, ambulating and transferring independently.  She is currently afebrile with stable vital signs.     Below, you will find a full discharge medication list and instructions.  We have arranged for Glory Ozuna to follow-up with us in our [facility] in 7-10 days with a Chest X-ray prior to that appointment.  We thank you for allowing us to participate in the care of Glory Ozuna here at [facility].  Please feel free to contact our office at [phone] with any questions or concerns or if we can be of any further assistance in the care of this patient.    Sincerely,    Dr. Maycol Mirza MD    D/C Summary Prepared by: Priscilla Trimble PA-C    Discharge Medications:  Discharge Medication List as of 2/22/2022  9:57 AM      START taking these medications    Details   acetaminophen (TYLENOL) 500 MG tablet Take 2 tablets (1,000 mg) by mouth every 6 hours, Disp-150 tablet, R-0, E-Prescribe      HYDROmorphone (DILAUDID) 2 MG tablet Take 1-2 tablets (2-4 mg) by mouth every 4 hours as needed for moderate to severe pain, Disp-25 tablet, R-0, E-Prescribe      ibuprofen (ADVIL/MOTRIN) 600 MG tablet Take 1 tablet (600 mg) by mouth 4 times daily, Disp-150 tablet, R-0, E-Prescribe       senna-docusate (SENOKOT-S/PERICOLACE) 8.6-50 MG tablet Take 1-4 tablets by mouth 2 times daily as needed for constipation, Disp-25 tablet, R-0, E-Prescribe         CONTINUE these medications which have NOT CHANGED    Details   aspirin (ASA) 81 MG EC tablet Take 81 mg by mouth, Historical      atorvastatin (LIPITOR) 10 MG tablet TAKE 1 TABLET BY MOUTH ONCE DAILY, Historical      buPROPion (ZYBAN) 150 MG 12 hr tablet Take 150 mg by mouth 2 times daily , Historical      calcium carbonate 1250 (500 Ca) MG CHEW Take 250 mg by mouth daily , Historical      calcium carbonate-vitamin D (OSCAL W/D) 500-200 MG-UNIT tablet Take 1 tablet by mouth daily, Historical      cholecalciferol 25 MCG (1000 UT) TABS Take 1,000 Units by mouth daily , Historical               Discharge Instructions:  1) Remove chest tube dressing on 2/24/22 and then it is Ok to shower.  Please wash both incision and chest tube site daily with soap and water.  You may cover the chest tube site daily with a clean band-aid or dry gauze if it continues to drain.  Once it stops draining, leave the site open to air and it will form a scab.  2) Steri-strips can be removed in 1 week or they will fall off when they are ready.  3) Continue daily use of your Incentive Spirometer, set of 10x in a row, every 1-2 hours while you are awake during the day. Also use your flutter valve if you received one during your stay.   4) No driving while on narcotic pain medications.    Follow-Up Care:  1) Follow up with Priscilla Trimble PA-C/Dr. Mirza at the [facility] in [place] ([address] [place] [address], [address], [place] [address]).  Call Kianna at [phone] to schedule the appointment.  2) Follow up with Primary Care Provider, Elvira Garg within 1 month of discharge for routine post-surgical care, wound check and follow up.  Please call [phone] to arrange this appointment.       CC  Patient Care Team:  Elvira Garg as PCP - General (Physician Assistant)  Danny  Mason Chilel MD as MD (Ophthalmology)  Kiko Perez MD as Referring Physician (Neurological Surgery)  Mason Delgado MD as Assigned Surgical Provider

## 2022-03-03 ENCOUNTER — OFFICE VISIT (OUTPATIENT)
Dept: OPHTHALMOLOGY | Facility: CLINIC | Age: 62
End: 2022-03-03
Attending: OPHTHALMOLOGY
Payer: COMMERCIAL

## 2022-03-03 DIAGNOSIS — H53.40 VISUAL FIELD DEFECT: ICD-10-CM

## 2022-03-03 DIAGNOSIS — H47.20 OPTIC ATROPHY: ICD-10-CM

## 2022-03-03 DIAGNOSIS — H53.40 VISUAL FIELD DEFECT: Primary | ICD-10-CM

## 2022-03-03 DIAGNOSIS — H49.00 OCULOMOTOR NERVE PALSY, UNSPECIFIED LATERALITY: Primary | ICD-10-CM

## 2022-03-03 PROCEDURE — 92012 INTRM OPH EXAM EST PATIENT: CPT | Performed by: OPHTHALMOLOGY

## 2022-03-03 PROCEDURE — 92133 CPTRZD OPH DX IMG PST SGM ON: CPT | Performed by: OPHTHALMOLOGY

## 2022-03-03 PROCEDURE — G0463 HOSPITAL OUTPT CLINIC VISIT: HCPCS | Mod: 25

## 2022-03-03 PROCEDURE — 92083 EXTENDED VISUAL FIELD XM: CPT | Performed by: OPHTHALMOLOGY

## 2022-03-03 ASSESSMENT — VISUAL ACUITY
OS_SC: 20/20
OS_SC+: -1
OD_SC: 20/50
METHOD: SNELLEN - LINEAR
OD_PH_SC: 20/30

## 2022-03-03 ASSESSMENT — EXTERNAL EXAM - LEFT EYE: OS_EXAM: NORMAL

## 2022-03-03 ASSESSMENT — TONOMETRY
IOP_METHOD: ICARE
OS_IOP_MMHG: 12
OD_IOP_MMHG: 9

## 2022-03-03 ASSESSMENT — CUP TO DISC RATIO
OS_RATIO: 0.3
OD_RATIO: 0.3

## 2022-03-03 ASSESSMENT — CONF VISUAL FIELD
OS_NORMAL: 1
OD_NORMAL: 1
METHOD: COUNTING FINGERS

## 2022-03-03 ASSESSMENT — SLIT LAMP EXAM - LIDS
COMMENTS: NORMAL
COMMENTS: NORMAL

## 2022-03-03 ASSESSMENT — EXTERNAL EXAM - RIGHT EYE: OD_EXAM: NORMAL

## 2022-03-03 NOTE — NURSING NOTE
"Chief Complaints and History of Present Illnesses   Patient presents with     Follow Up     Chief Complaint(s) and History of Present Illness(es)     Follow Up     Laterality: both eyes    Onset: months ago    Course: stable    Associated symptoms: double vision (\"a little bit\", superior gaze, looking left and looking right) and headache (\"not attributing to eyes\").  Negative for photophobia and eye pain    Pain scale: 0/10              Comments     1. 3rd cranial nerve palsy, unspecified laterality   2. Subjective visual disturbance   3. Visual field defect   4. Optic atrophy     Patient notes she has problems with depth perception. Denies tinnitis.     Nona Topete, COT 9:10 AM 03/03/2022                  "

## 2022-03-03 NOTE — PROGRESS NOTES
Assessment & Plan     Glory Ozuna is a 61 year old female with the following diagnoses:   1. Oculomotor nerve palsy, unspecified laterality    2. Visual field defect    3. 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  Last visit her eye misalignment was improving.  She had a repeatable visual field defect and nerve fiber layer thinning on OCT.  She has only intermittent double vision mainly when watching television and laying down     Visual acuity with pinhole 0/30 RIGHT eye and 20/20 left eye.  Her eye misalignment is overall stable.  She has some aberrant regeneration.  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  Her eye misalignment is overall stable.  This will most likely remain this way.  She has some aberrant regeneration.  Follow up in 1 year or sooner as needed for worsening symptoms.              Attending Physician Attestation:  Complete documentation of historical and exam elements from today's encounter can be found in the full encounter summary report (not reduplicated in this progress note).  I personally obtained the chief complaint(s) and history of present illness.  I confirmed and edited as necessary the review of systems, past medical/surgical history, family history, social history, and examination findings as documented by others; and I examined the patient myself.  I personally reviewed the relevant tests, images, and reports as documented above.  I formulated and edited as necessary the assessment and plan and discussed the findings and management plan with the patient and family. - Mason Delgado MD

## 2022-04-09 ENCOUNTER — HEALTH MAINTENANCE LETTER (OUTPATIENT)
Age: 62
End: 2022-04-09

## 2022-06-01 ENCOUNTER — ANCILLARY PROCEDURE (OUTPATIENT)
Dept: CT IMAGING | Facility: CLINIC | Age: 62
End: 2022-06-01
Attending: THORACIC SURGERY (CARDIOTHORACIC VASCULAR SURGERY)
Payer: COMMERCIAL

## 2022-06-01 DIAGNOSIS — C34.90 NON-SMALL CELL LUNG CANCER, UNSPECIFIED LATERALITY (H): ICD-10-CM

## 2022-06-01 PROCEDURE — 250N000011 HC RX IP 250 OP 636: Performed by: THORACIC SURGERY (CARDIOTHORACIC VASCULAR SURGERY)

## 2022-06-01 PROCEDURE — 71260 CT THORAX DX C+: CPT

## 2022-06-01 RX ORDER — IOPAMIDOL 755 MG/ML
80 INJECTION, SOLUTION INTRAVASCULAR ONCE
Status: COMPLETED | OUTPATIENT
Start: 2022-06-01 | End: 2022-06-01

## 2022-06-01 RX ADMIN — IOPAMIDOL 80 ML: 755 INJECTION, SOLUTION INTRAVENOUS at 14:55

## 2022-10-09 ENCOUNTER — HEALTH MAINTENANCE LETTER (OUTPATIENT)
Age: 62
End: 2022-10-09

## 2023-02-18 ENCOUNTER — HEALTH MAINTENANCE LETTER (OUTPATIENT)
Age: 63
End: 2023-02-18

## 2023-05-01 ENCOUNTER — ANCILLARY PROCEDURE (OUTPATIENT)
Dept: CT IMAGING | Facility: CLINIC | Age: 63
End: 2023-05-01
Attending: THORACIC SURGERY (CARDIOTHORACIC VASCULAR SURGERY)
Payer: COMMERCIAL

## 2023-05-01 DIAGNOSIS — C34.90 NON-SMALL CELL LUNG CANCER (H): ICD-10-CM

## 2023-05-01 PROCEDURE — 71260 CT THORAX DX C+: CPT

## 2023-05-01 PROCEDURE — 255N000002 HC RX 255 OP 636: Performed by: THORACIC SURGERY (CARDIOTHORACIC VASCULAR SURGERY)

## 2023-05-01 RX ADMIN — IOHEXOL 80 ML: 350 INJECTION, SOLUTION INTRAVENOUS at 12:57

## 2023-06-08 ENCOUNTER — OFFICE VISIT (OUTPATIENT)
Dept: OPHTHALMOLOGY | Facility: CLINIC | Age: 63
End: 2023-06-08
Attending: OPHTHALMOLOGY
Payer: COMMERCIAL

## 2023-06-08 DIAGNOSIS — H53.40 VISUAL FIELD DEFECT: Primary | ICD-10-CM

## 2023-06-08 DIAGNOSIS — H53.40 VISUAL FIELD DEFECT: ICD-10-CM

## 2023-06-08 DIAGNOSIS — H49.00 3RD CRANIAL NERVE PALSY, UNSPECIFIED LATERALITY: ICD-10-CM

## 2023-06-08 DIAGNOSIS — H47.20 OPTIC ATROPHY: ICD-10-CM

## 2023-06-08 DIAGNOSIS — D49.7 PITUITARY TUMOR: Primary | ICD-10-CM

## 2023-06-08 PROCEDURE — G0463 HOSPITAL OUTPT CLINIC VISIT: HCPCS | Performed by: OPHTHALMOLOGY

## 2023-06-08 PROCEDURE — 92014 COMPRE OPH EXAM EST PT 1/>: CPT | Performed by: OPHTHALMOLOGY

## 2023-06-08 PROCEDURE — 92133 CPTRZD OPH DX IMG PST SGM ON: CPT | Performed by: OPHTHALMOLOGY

## 2023-06-08 PROCEDURE — 92083 EXTENDED VISUAL FIELD XM: CPT | Performed by: OPHTHALMOLOGY

## 2023-06-08 RX ORDER — ALBUTEROL SULFATE 90 UG/1
2 AEROSOL, METERED RESPIRATORY (INHALATION) EVERY 4 HOURS PRN
COMMUNITY
Start: 2023-05-28

## 2023-06-08 RX ORDER — FLUTICASONE PROPIONATE AND SALMETEROL 100; 50 UG/1; UG/1
POWDER RESPIRATORY (INHALATION)
COMMUNITY
Start: 2023-05-28

## 2023-06-08 ASSESSMENT — SLIT LAMP EXAM - LIDS
COMMENTS: NORMAL
COMMENTS: NORMAL

## 2023-06-08 ASSESSMENT — CONF VISUAL FIELD
OS_SUPERIOR_TEMPORAL_RESTRICTION: 0
OS_INFERIOR_NASAL_RESTRICTION: 0
OS_SUPERIOR_NASAL_RESTRICTION: 0
OS_INFERIOR_TEMPORAL_RESTRICTION: 0
OD_SUPERIOR_TEMPORAL_RESTRICTION: 0
OD_INFERIOR_NASAL_RESTRICTION: 0
OD_INFERIOR_TEMPORAL_RESTRICTION: 0
OD_SUPERIOR_NASAL_RESTRICTION: 0

## 2023-06-08 ASSESSMENT — EXTERNAL EXAM - RIGHT EYE: OD_EXAM: NORMAL

## 2023-06-08 ASSESSMENT — CUP TO DISC RATIO
OS_RATIO: 0.3
OD_RATIO: 0.3

## 2023-06-08 ASSESSMENT — VISUAL ACUITY
OD_PH_SC: 20/25
OS_SC+: -2
METHOD: SNELLEN - LINEAR
OD_SC: 20/50
OD_SC+: -1
OS_SC: 20/20

## 2023-06-08 ASSESSMENT — EXTERNAL EXAM - LEFT EYE: OS_EXAM: NORMAL

## 2023-06-08 ASSESSMENT — TONOMETRY
IOP_METHOD: TONOPEN
OS_IOP_MMHG: 12
OD_IOP_MMHG: 10

## 2023-06-08 NOTE — PROGRESS NOTES
"       Assessment & Plan     Glory Ozuna is a 63 year old female with the following diagnoses:   1. Pituitary tumor    2. Visual field defect    3. 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  At the time of last visit her eye misalignment was overall stable.  She had developed some aberrant regeneration.     She reports no subjective changes in her vision since her last visit. Noticing diplopia with side gaze to both sides that alternates between vertical and diagonal. Notices the diplopia when laying down and watching TV. Diplopia goes away with closing right eye. No headaches. Has been noticing \"heavy eyelids\" more than normal on both sides. Notices this the most when putting on eye makeup. Unsure when this started. No ocular pain.     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 Overall stable appearance of enhancing soft tissue expanding the right cavernous sinus, marginating the right anterior clinoid process and extending along the supraclinoid ICA to the terminus, likely representing residual tumor and/or posttreatment change.   2. No evidence of significant interval tumor progression. The right intracranial ICA appears grossly patent.      Uncorrected distance visual acuity was 20/50 -1 in the right eye and 20/20 -2 in the left eye. Intraocular pressure was 10 in the right eye and 12 in the left eye using Tonopen.  Color vision 11/11 right eye and 11/11 left eye.  Pupils asymmetric 4.5 in the right, 4 in the left in dim lighting, no APD.  Anterior segment exam " unremarkable.  Fundus exam unremarkable. Strabismus exam with right eye motility deficit most restricted in up gaze and left gaze. Symmetric face turn, shoulder shrug, eyebrow raise, smile, palate elevation, tongue protrusion. CNV1, V2, V3 symmetric sensation.     It is my impression that the patient has residual motility restriction status post removal of suprasellar lesion in 3/2020. Exam suggestive of eye in down and out position in primary gaze, consistent with a 3rd nerve palsy. Subtle elevation of right eye with left gaze suggestive of 4th nerve palsy. Markedly improved visual field today. Her retinal nerve fiber layer is stable.      She is able to find head position that allows her to not see double. Offered patching or black contact lens if the diplopia bothers her, but overall unchanged exam and symptoms so nothing to do at this point.     - Follow-up in 1 year or sooner if worsening symptoms or diplopia         Attending Physician Attestation:  Complete documentation of historical and exam elements from today's encounter can be found in the full encounter summary report (not reduplicated in this progress note).  I personally obtained the chief complaint(s) and history of present illness.  I confirmed and edited as necessary the review of systems, past medical/surgical history, family history, social history, and examination findings as documented by others; and I examined the patient myself.  I personally reviewed the relevant tests, images, and reports as documented above.  I formulated and edited as necessary the assessment and plan and discussed the findings and management plan with the patient and family. I was present with the medical student who participated in the service and in the documentation of this note. - MD Sandra Plasencia, MS4

## 2023-11-01 ENCOUNTER — ANCILLARY PROCEDURE (OUTPATIENT)
Dept: CT IMAGING | Facility: CLINIC | Age: 63
End: 2023-11-01
Attending: THORACIC SURGERY (CARDIOTHORACIC VASCULAR SURGERY)
Payer: COMMERCIAL

## 2023-11-01 DIAGNOSIS — C34.12 MALIGNANT NEOPLASM OF BRONCHUS OF LEFT UPPER LOBE (H): ICD-10-CM

## 2023-11-01 PROCEDURE — 71250 CT THORAX DX C-: CPT

## 2024-02-01 ENCOUNTER — MEDICAL CORRESPONDENCE (OUTPATIENT)
Dept: SCHEDULING | Facility: CLINIC | Age: 64
End: 2024-02-01
Payer: COMMERCIAL

## 2024-05-25 ENCOUNTER — HEALTH MAINTENANCE LETTER (OUTPATIENT)
Age: 64
End: 2024-05-25

## 2024-11-27 ENCOUNTER — ANCILLARY PROCEDURE (OUTPATIENT)
Dept: CT IMAGING | Facility: CLINIC | Age: 64
End: 2024-11-27
Attending: THORACIC SURGERY (CARDIOTHORACIC VASCULAR SURGERY)
Payer: COMMERCIAL

## 2024-11-27 DIAGNOSIS — C34.12 PRIMARY NON-SMALL CELL CARCINOMA OF UPPER LOBE OF LEFT LUNG (H): ICD-10-CM

## 2024-11-27 PROCEDURE — 71250 CT THORAX DX C-: CPT

## 2025-05-08 ENCOUNTER — ANCILLARY PROCEDURE (OUTPATIENT)
Dept: CT IMAGING | Facility: CLINIC | Age: 65
End: 2025-05-08
Attending: THORACIC SURGERY (CARDIOTHORACIC VASCULAR SURGERY)
Payer: COMMERCIAL

## 2025-05-08 DIAGNOSIS — Z85.118 HISTORY OF LUNG CANCER: ICD-10-CM

## 2025-05-08 PROCEDURE — 71250 CT THORAX DX C-: CPT

## 2025-07-26 ENCOUNTER — HEALTH MAINTENANCE LETTER (OUTPATIENT)
Age: 65
End: 2025-07-26

## (undated) DEVICE — ANTIFOG SOLUTION W/FOAM PAD 31142527

## (undated) DEVICE — ENDO TROCAR THORACIC 10/12MM TT012

## (undated) DEVICE — GLOVE PROTEXIS W/NEU-THERA 6.5  2D73TE65

## (undated) DEVICE — SU VICRYL 2-0 CT-2 27" J333H

## (undated) DEVICE — SURGICEL HEMOSTAT 3X4" NUKNIT 1943

## (undated) DEVICE — PREP CHLORAPREP 26ML TINTED ORANGE  260815

## (undated) DEVICE — SOL WATER IRRIG 1000ML BOTTLE 2F7114

## (undated) DEVICE — SUCTION MINISQUAIR SMOKE EVAC CAPTURE DEVICE SQ20012-01

## (undated) DEVICE — STPL ENDO ARTICULATING 60MM EC60A

## (undated) DEVICE — DRSG GAUZE 4X4" 3033

## (undated) DEVICE — STPL RELOAD REG/THK TISSUE ECHELON 60 X 3.8MM GOLD GST60D

## (undated) DEVICE — LINEN GOWN OVERSIZE 5408

## (undated) DEVICE — SUCTION CANISTER MEDIVAC LINER 3000ML W/LID 65651-530

## (undated) DEVICE — SU VICRYL 1 CT-2 27" J335H

## (undated) DEVICE — SUCTION DRY CHEST DRAIN OASIS 3600-100

## (undated) DEVICE — SU VICRYL 4-0 PS-2 18" UND J496H

## (undated) DEVICE — GLOVE PROTEXIS W/NEU-THERA 7.5  2D73TE75

## (undated) DEVICE — BLADE KNIFE SURG 15 371115

## (undated) DEVICE — ESU GROUND PAD UNIVERSAL W/O CORD

## (undated) DEVICE — TUBING SUCTION MEDI-VAC SOFT 3/16"X20' N520A

## (undated) DEVICE — SYR BULB IRRIG 50ML LATEX FREE 0035280

## (undated) DEVICE — DRAIN CHEST TUBE 28FR STR 8028

## (undated) DEVICE — LINEN TOWEL PACK X5 5464

## (undated) DEVICE — PACK MINOR SBA15MIFSE

## (undated) DEVICE — DRSG KERLIX 4 1/2"X4YDS ROLL 6715

## (undated) DEVICE — DRAPE BREAST/CHEST 29420

## (undated) DEVICE — DRAPE IOBAN INCISE 23X17" 6650EZ

## (undated) DEVICE — NDL 22GA 1.5"

## (undated) DEVICE — GOWN IMPERVIOUS ZONED LG

## (undated) DEVICE — SOL NACL 0.9% IRRIG 1000ML BOTTLE 2F7124

## (undated) DEVICE — BLADE KNIFE SURG 10 371110

## (undated) DEVICE — ESU ELEC BLADE 6" COATED/INSULATED E1455-6

## (undated) DEVICE — SU SILK 2 REEL 60" SA8H

## (undated) DEVICE — TAPE DRSG UNIVERSAL CLOTH 3" WHITE LATEX 881-3

## (undated) DEVICE — ESU CORD MONOPOLAR 10'  E0510

## (undated) DEVICE — SU NDL CUT REV MED 3/8 209014

## (undated) DEVICE — DRAPE POUCH INSTRUMENT 1018

## (undated) DEVICE — SYSTEM CLEARIFY VISUALIZATION 21-345

## (undated) RX ORDER — PROPOFOL 10 MG/ML
INJECTION, EMULSION INTRAVENOUS
Status: DISPENSED
Start: 2022-02-21

## (undated) RX ORDER — BUPIVACAINE HYDROCHLORIDE 5 MG/ML
INJECTION, SOLUTION EPIDURAL; INTRACAUDAL
Status: DISPENSED
Start: 2022-02-21

## (undated) RX ORDER — HYDROMORPHONE HYDROCHLORIDE 1 MG/ML
INJECTION, SOLUTION INTRAMUSCULAR; INTRAVENOUS; SUBCUTANEOUS
Status: DISPENSED
Start: 2022-02-21

## (undated) RX ORDER — FENTANYL CITRATE 0.05 MG/ML
INJECTION, SOLUTION INTRAMUSCULAR; INTRAVENOUS
Status: DISPENSED
Start: 2022-02-21

## (undated) RX ORDER — CEFAZOLIN SODIUM/WATER 2 G/20 ML
SYRINGE (ML) INTRAVENOUS
Status: DISPENSED
Start: 2022-02-21

## (undated) RX ORDER — ONDANSETRON 2 MG/ML
INJECTION INTRAMUSCULAR; INTRAVENOUS
Status: DISPENSED
Start: 2022-02-21

## (undated) RX ORDER — FENTANYL CITRATE 50 UG/ML
INJECTION, SOLUTION INTRAMUSCULAR; INTRAVENOUS
Status: DISPENSED
Start: 2022-02-21

## (undated) RX ORDER — LIDOCAINE HYDROCHLORIDE 20 MG/ML
INJECTION, SOLUTION EPIDURAL; INFILTRATION; INTRACAUDAL; PERINEURAL
Status: DISPENSED
Start: 2022-02-21

## (undated) RX ORDER — DEXAMETHASONE SODIUM PHOSPHATE 4 MG/ML
INJECTION, SOLUTION INTRA-ARTICULAR; INTRALESIONAL; INTRAMUSCULAR; INTRAVENOUS; SOFT TISSUE
Status: DISPENSED
Start: 2022-02-21